# Patient Record
Sex: MALE | Race: WHITE | NOT HISPANIC OR LATINO | Employment: OTHER | URBAN - METROPOLITAN AREA
[De-identification: names, ages, dates, MRNs, and addresses within clinical notes are randomized per-mention and may not be internally consistent; named-entity substitution may affect disease eponyms.]

---

## 2017-01-06 ENCOUNTER — ANTICOAGULATION MONITORING (OUTPATIENT)
Dept: VASCULAR LAB | Facility: MEDICAL CENTER | Age: 82
End: 2017-01-06

## 2017-01-06 DIAGNOSIS — I48.91 ATRIAL FIBRILLATION, UNSPECIFIED TYPE (HCC): ICD-10-CM

## 2017-01-06 LAB — INR PPP: 2.4 (ref 2–3.5)

## 2017-01-06 NOTE — PROGRESS NOTES
Anticoagulation Summary as of 1/6/2017     INR goal 2.0-3.0   Selected INR 2.4 (1/6/2017)   Maintenance plan 8 mg (4 mg x 2) on Tue, Thu, Sat; 4 mg (4 mg x 1) all other days   Weekly total 40 mg   Plan last modified JARED RomanoPSUNSHINE (3/22/2016)   Next INR check 1/17/2017   Target end date Indefinite    Indications   Atrial fibrillation (HCC) [I48.91]         Anticoagulation Episode Summary     INR check location Outside Lab    Preferred lab     Send INR reminders to     Comments Please do not call before noon  [facility] for lab draws      Anticoagulation Care Providers     Provider Role Specialty Phone number    Jesús Bacon M.D. Referring Interventional Cardiology [phone]    Michael Morel, PHARMD Responsible      [email or web address] [email or web address]. Responsible          Spoke with Erasmo to report a therapeutic INR of 2.4. Pt is to continue with current warfarin dosing regimen.  Pt denies any unusual s/s of bleeding, bruising, clotting or any changes to diet or medications.  Follow up in 2 weeks.    Kelley Olivia, PHARMD

## 2017-01-17 ENCOUNTER — APPOINTMENT (OUTPATIENT)
Dept: VASCULAR LAB | Facility: MEDICAL CENTER | Age: 82
End: 2017-01-17
Payer: MEDICARE

## 2017-01-17 ENCOUNTER — ANTICOAGULATION MONITORING (OUTPATIENT)
Dept: VASCULAR LAB | Facility: MEDICAL CENTER | Age: 82
End: 2017-01-17

## 2017-01-17 ENCOUNTER — NON-PROVIDER VISIT (OUTPATIENT)
Dept: MEDICAL GROUP | Facility: PHYSICIAN GROUP | Age: 82
End: 2017-01-17
Payer: MEDICARE

## 2017-01-17 DIAGNOSIS — I48.91 ATRIAL FIBRILLATION, UNSPECIFIED TYPE (HCC): ICD-10-CM

## 2017-01-17 DIAGNOSIS — Z79.01 CHRONIC ANTICOAGULATION: ICD-10-CM

## 2017-01-17 LAB
INR BLD: 3.4 (ref 0.9–1.2)
INR PPP: 3.4 (ref 2–3.5)
POC PROTIME: ABNORMAL

## 2017-01-17 PROCEDURE — 85610 PROTHROMBIN TIME: CPT | Performed by: INTERNAL MEDICINE

## 2017-01-17 NOTE — PROGRESS NOTES
OP Anticoagulation Service Note    Date: 1/17/2017    Anticoagulation Summary as of 1/17/2017     INR goal 2.0-3.0   Selected INR 3.4! (1/17/2017)   Maintenance plan 8 mg (4 mg x 2) on Tue, Thu, Sat; 4 mg (4 mg x 1) all other days   Weekly total 40 mg   Plan last modified JARED RomanoPMamiNMami (3/22/2016)   Next INR check 1/31/2017   Target end date Indefinite    Indications   Atrial fibrillation (HCC) [I48.91]         Anticoagulation Episode Summary     INR check location Outside Lab    Preferred lab     Send INR reminders to     Comments Please do not call before noon  [facility] for lab draws      Anticoagulation Care Providers     Provider Role Specialty Phone number    Jesús Bacon M.D. Referring Interventional Cardiology [phone]    Michael Morel, PHARMD Responsible          Anticoagulation Patient Findings   Positives Antibiotic Use    Negatives Missed Doses, Extra Doses, Medication Changes, Diet Changes, Dental/Other Procedures, Hospitalization, Bleeding Gums, Nose Bleeds, Blood in Urine, Blood in Stool, Any Bruising, Other Complaints    Comments BP  100/58  HR  100  Off ABX for 3 days            THIS VISIT CONDUCTED WITH [employer]ING SECURE AND ENCRYPTED VIDEOCONFERENCING EQUIPMENT    ROS:    Pulm: Denies SOB, chest pain.    Card: Denies syncope, edema, palpitations.    Extremities: Denies redness, pain.     PE:    Pulm: No SOB, even and unlabored.    Card: Normal rate and rhythm.    Extremities: No redness, or edema.     INR  supra-therapeutic.    Denies signs/symptoms of bleeding and/or thrombosis.    He was on a ABX, off for the last 3 days    Follow up appt in 2 weeks     Hold dose tomorrow     Medication: Warfarin (Coumadin)     Sunday Monday Tuesday Wednesday Thursday Friday Saturday      4 mg    4 mg    8 mg    4 mg    8 mg    4 mg    8 mg      1 tab(s)    1 tab(s)    2 tab(s)    1 tab(s)    2 tab(s)    1 tab(s)  2 tab(s)     Next Appointment:  Tuesday, 1/31/2017 at 1:30pm      Review all of your home medications and newly ordered medications with your doctor and / or pharmacist. Follow medication instructions as directed by your doctor and / or pharmacist. Please keep your medication list with you and share with your physician. Update the information when medications are discontinued, doses are changed, or new medications (including over-the-counter products) are added; and carry medication information at all times in the event of emergency situations.      For questions, please contact Outpatient Anticoagulation Service [phone].     Felipe Anaya, NIRD

## 2017-01-19 ENCOUNTER — TELEPHONE (OUTPATIENT)
Dept: CARDIOLOGY | Facility: MEDICAL CENTER | Age: 82
End: 2017-01-19

## 2017-01-19 DIAGNOSIS — Z95.2 S/P TAVR (TRANSCATHETER AORTIC VALVE REPLACEMENT): ICD-10-CM

## 2017-01-19 NOTE — TELEPHONE ENCOUNTER
----- Message from Bianca Evans sent at 1/19/2017  9:42 AM PST -----  Regarding: patient wants to discuss letter he received from Medicare  XENIA/Rubio        Patient said he got a letter from Medicare about surgery being completed by Dr. Watson. He is upset because he thought it was done by Dr. Perez, and he thinks he was fraudently operated on. I told him the chart said it was Dr. Perez, so he wants to know why the letter says it was Dr. uDran. He can be reached at [phone]

## 2017-01-19 NOTE — TELEPHONE ENCOUNTER
"Pt gave details about his concerns regarding the letter he received from medicare that Dr. Duran performed his aortic valve replacement as referred to by Dr. Perez. Pt feels he was lied to as he thought Dr. Perez was doing the procedure and \"inserted the catheter through his artery to the aortic valve and then Dr. Duran was to replace the valve.\" Explained that this was true. Reassured the pt but he still feels like he was deceived saying, \"I never saw Dr. perez that day (of surgery) only Dr. Duran so how do I know he was even there.\" Reassured pt that Dr. Duran and Dr. Perez worked in collaboration to complete the procedure. Pt states, \"I dont mean to be rude but if you weren't in the operating room that day how can you say what was done\". Responded to the pt that health care providers are responsible to document everything that happened in the chart and that I am speaking to what is documented in his medical record. Pt is still very concerned.     Pt will bring the medicare letter to his appt tomorrow with XENIA to discuss with him.     To XENIA. FYI  "

## 2017-01-20 ENCOUNTER — OFFICE VISIT (OUTPATIENT)
Dept: CARDIOLOGY | Facility: PHYSICIAN GROUP | Age: 82
End: 2017-01-20
Payer: MEDICARE

## 2017-01-20 VITALS
WEIGHT: 139 LBS | DIASTOLIC BLOOD PRESSURE: 72 MMHG | BODY MASS INDEX: 23.73 KG/M2 | HEIGHT: 64 IN | SYSTOLIC BLOOD PRESSURE: 130 MMHG | HEART RATE: 100 BPM

## 2017-01-20 DIAGNOSIS — Z95.1 HX OF CABG: ICD-10-CM

## 2017-01-20 DIAGNOSIS — Z95.2 S/P TAVR (TRANSCATHETER AORTIC VALVE REPLACEMENT): ICD-10-CM

## 2017-01-20 DIAGNOSIS — I48.91 ATRIAL FIBRILLATION, UNSPECIFIED TYPE (HCC): ICD-10-CM

## 2017-01-20 DIAGNOSIS — I48.20 CHRONIC ATRIAL FIBRILLATION (HCC): ICD-10-CM

## 2017-01-20 DIAGNOSIS — I25.10 CORONARY ARTERY DISEASE INVOLVING NATIVE CORONARY ARTERY OF NATIVE HEART WITHOUT ANGINA PECTORIS: ICD-10-CM

## 2017-01-20 PROCEDURE — 99214 OFFICE O/P EST MOD 30 MIN: CPT | Performed by: INTERNAL MEDICINE

## 2017-01-20 ASSESSMENT — ENCOUNTER SYMPTOMS
PALPITATIONS: 0
PND: 0
ABDOMINAL PAIN: 0
WEAKNESS: 1
MYALGIAS: 0
LOSS OF CONSCIOUSNESS: 0
BRUISES/BLEEDS EASILY: 1
SHORTNESS OF BREATH: 1
BLURRED VISION: 0
DIZZINESS: 0
CHILLS: 0
INSOMNIA: 1
ORTHOPNEA: 0
FEVER: 0

## 2017-01-20 NOTE — PROGRESS NOTES
Subjective:   Erasmo North is a 86 y.o. male who presents today for TAVR follow up.    Since the patient's last visit on 12/06/16, he has continued to experience fatigue and shortness of breath. He denie chest pain, dizziness or syncope.     Past Medical History   Diagnosis Date   • Atrial fibrillation (CMS-HCC)    • Hypertension    • CAD (coronary artery disease) of bypass graft    • Osteoporosis    • Genital herpes    • S/P colonoscopy 11/2010   • Diverticulosis    • Colon polyps      sessile    • Hemorrhoids      internal   • Hiatal hernia    • Tachy-millicent syndrome    • Urothelial carcinoma of kidney (CMS-HCC) 2/22/2014     Bx right kidney 2/3/14   • Dialysis patient      Monday, Wednesday, Friday at [facility] in [place]   • Arthritis    • Myocardial infarct (CMS-HCC) 1971   • Bowel habit changes      occasional diarrhea   • Dental disorder      upper/lower   • Breath shortness      with exertion   • Cataract      surgery bilateral IOL   • Urothelial carcinoma (CMS-HCC) 2/22/2014     Of the bladder; bx 2/3/14; Dr. Bey, pt states right kidney   • Aortic stenosis, severe    • Psoriasis    • Dialysis AV fistula infection (CMS-HCC)      right arm     Past Surgical History   Procedure Laterality Date   • Coronary artery bypass, 3  1974, 1983      had 2 times   • Hernia repair  1999   • Tonsillectomy     • Cath placement Left 8/31/2015     Procedure: CATH PLACEMENT, left IJ tunneled catheter;  Surgeon: Jaswinder Oneil M.D.;  Location: SURGERY [facility];  Service:    • Av fistula creation Right 9/8/2015     Procedure: AV FISTULA CREATION ARM;  Surgeon: Jaswinder Oneil M.D.;  Location: SURGERY [facility];  Service:    • Recovery  11/12/2015     Procedure: VASCULAR CASE-ANTHONY-RIGHT ARM FISTULOGRAM WITH ANGIOPLASTY 31007, 91048, 49512-MBW STAGE RENAL DISEASE N18.6;  Surgeon: [facility];  Location: SURGERY PRE-POST PROC UNIT [facility];  Service:    • Inguinal hernia repair  1/5/2012     Performed by  ROSI PHILLIPS at SURGERY [facility]   • Exploratory laparotomy  1/8/2012     Performed by ROSI PHILLIPS at SURGERY [facility]   • Bowel resection  1/8/2012     Performed by ROSI PHILLIPS at SURGERY [facility]   • Other abdominal surgery  5/29/2015     right nephrectomy, [facility]   • Transcatheter aortic valve replacement  11/28/2016     Procedure: TRANSCATHETER AORTIC VALVE REPLACEMENT WITH CHAZ;  Surgeon: Mateo Perez M.D.;  Location: SURGERY [facility];  Service:      Family History   Problem Relation Age of Onset   • Heart Disease Mother      MI age 75 yo   • Heart Disease Brother      MI 38 yo   • Heart Disease Brother      MI 38 yo   • Cancer Neg Hx    • Diabetes Neg Hx      History   Smoking status   • Former Smoker -- 1.00 packs/day for 27 years   • Types: Cigarettes   • Quit date: 01/01/1974   Smokeless tobacco   • Never Used     Comment: started age 17, quit age 44     No Known Allergies     Medications reviewed.    Outpatient Encounter Prescriptions as of 1/20/2017   Medication Sig Dispense Refill   • omeprazole (PRILOSEC) 40 MG delayed-release capsule Take 40 mg by mouth every day.     • metoprolol SR (TOPROL XL) 25 MG TABLET SR 24 HR Take 25 mg by mouth every day.     • acetaminophen (TYLENOL) 325 MG Tab Take 325-650 mg by mouth every four hours as needed for Moderate Pain.     • warfarin (COUMADIN) 4 MG Tab Take 4-8 mg by mouth every day. Pt takes:  4MG on Sun,Mon,Wed, and Fri.  8MG on Tue, Thur, and Sat     • Multiple Vitamins-Minerals (CENTRUM ADULTS PO) Take 1 Tab by mouth every day.     • IRON PO Take 1 Tab by mouth every day.     • B Complex Vitamins (VITAMIN B COMPLEX PO) Take 1 Tab by mouth every day.     • Calcium Citrate-Vitamin D (CALCIUM + D PO) Take 1 Tab by mouth every day.     • atorvastatin (LIPITOR) 10 MG Tab Take 10 mg by mouth every evening.     • finasteride (PROSCAR) 5 MG Tab Take 5 mg by mouth every bedtime.       No facility-administered  "encounter medications on file as of 1/20/2017.     Review of Systems   Constitutional: Positive for malaise/fatigue. Negative for fever and chills.   HENT: Negative for congestion.    Eyes: Negative for blurred vision.   Respiratory: Positive for shortness of breath.    Cardiovascular: Negative for chest pain, palpitations, orthopnea, leg swelling and PND.   Gastrointestinal: Negative for abdominal pain.   Genitourinary: Negative for dysuria.   Musculoskeletal: Negative for myalgias and joint pain.   Skin: Negative for rash.   Neurological: Negative for dizziness and loss of consciousness.   Endo/Heme/Allergies: Bruises/bleeds easily.   Psychiatric/Behavioral: The patient has insomnia.         Objective:   /72 mmHg  Pulse 100  Ht 1.626 m (5' 4\")  Wt 63.05 kg (139 lb)  BMI 23.85 kg/m2    Physical Exam   Constitutional: He is oriented to person, place, and time. He appears well-developed and well-nourished.   HENT:   Head: Normocephalic and atraumatic.   Eyes: Conjunctivae are normal. Pupils are equal, round, and reactive to light.   Neck: Normal range of motion. Neck supple.   Cardiovascular: Normal rate.  An irregularly irregular rhythm present.   Pulmonary/Chest: Effort normal and breath sounds normal.   Abdominal: Soft. Bowel sounds are normal.   Musculoskeletal: Normal range of motion. He exhibits no edema.   Neurological: He is alert and oriented to person, place, and time.   Skin: Skin is warm and dry.   Psychiatric: He has a normal mood and affect.     CARDIAC STUDIES/PROCEDURES:    CARDIAC CATHETERIZATION CONCLUSIONS (11/10/16)  1.  Severe aortic stenosis with peak gradient of 30 mmHg, mean gradient of 19    mmHg, calculated ZEYAD of 0.99 cm2.  2.  Coronary artery disease including occluded ostial left main coronary    artery and proximal right coronary artery with patent left internal mammary    artery to the left anterior descending artery and saphenous vein graft to the    posterior descending " artery, occluded single saphenous vein graft.  3.  Mildly reduced left ventricular systolic function with ejection fraction    of 50%.  4.  Moderately calcified trileaflet aortic valve, mild dilation of the    ascending aorta.  5.  Atherosclerotic distal abdominal aorta with small aneurysmal pouches,    severely calcified bilateral ostial to proximal common iliac system, right    greater than left and right iliacs also containing eccentric 40-50% stenosis.    CT OF CHEST AND ABDOMEN (11/04/16)  1. Tricuspid aortic valve with moderate calcifications.  Annulus area: 566-570 mm?  Annulus diameter: 23.7 x 29.3 mm  Diameter at the level of the sinuses: 38.2 x 38.4 x 39.2 mm  2. Access evaluation:  Mild scattered calcification and low density plaque.  Measurement, minimal luminal diameter:  Right mid external iliac artery:   8.4 mm  Left proximal external iliac artery: 8.0  mm  3. Postsurgical changes from CABG.  4. Small left pleural effusion, trace right pleural effusion with adjacent opacities, likely atelectasis.    CAROTID ULTRASOUND (11/08/16)   Carotid ultrasound showing mild to moderate carotid artery stenosis.    ECHOCARDIOGRAM CONCLUSIONS (01/10/17)  Echocardiogram showing normal left ventricular systolic function, ejection fraction of 50%,known TAVR valve with normal gradient with peak velocity of 2.4 m/s, peak gradient of 24 mmHg, mean gradient of 13 mmHg, mild mitral regurgitation.    ECHOCARDIOGRAM CONCLUSIONS (11/29/16)  Normal left ventricular systolic function, EF 65%.   Mild concentric left ventricular hypertrophy.   Moderately dilated right ventricle.   Severely dilated left atrium.   Mitral annular calcification.   Mild mitral regurgitation.  Known TAVR aortic valve that is functioning normally with normal   transvalvular gradients.    Transvalvular gradients are - Peak: 14 mmHg, Mean: 7 mmHg.   Trace paravalvular leak is noted.   Mild tricuspid regurgitation.   Right ventricular systolic pressure is  estimated to be 35 mmHg.  Compared to the images of the study done 8/31/15 - there has been   interval placement of a bioprosthetic aortic valve that is functioning   normally and normalization of left ventricular ejection fraction.    ECHOCARDIOGRAM CONCLUSIONS at [facility] (07/05/16)  Echocardiogram showing ejection fraction of 50%, severe aortic stenosis with peak velocity of 4.01 m/s, peak gradient of 68 mmHg, mean gradient of 41mmHg and calculated ZEYAD of 0.6 cm2.    EKG performed on (11/29/16) was reviewed: EKG shows atrial fibrillation with right bundle branch block    Laboratory results of (12/22/16) were reviewed. Bun of 38 mg/dl, creatinine levels of 3.13 mg/dl noted.  Laboratory results of (11/29/16) were reviewed. Bun of 20 mg/dl, creatinine levels of 2.66 mg/dl noted.    PULMONARY FUNCTION INTERPRETATION (10/27/16)  1.  The mechanics of ventilation are normal.  2.  There is no improvement following the administration of an inhaled    bronchodilator.  Clinical improvement may occur in the absence of documented    spirometric improvement.  3.  Normal total lung capacity by body plethysmography.  This rules out    restrictive lung defect.  4.  Severely decreased diffusing capacity for carbon monoxide, 51% of    predicted; this indicates loss of alveolocapillary membrane surface area.  5.  Normal airway resistance.  6.  Review of the flow volume loop shows a normal tracing.  7.  Normal room air oximetry.    TRANSESOPHAGEAL ECHOCARDIOGRAM CONCLUSIONS (11/28/16)  TAVR case. Trileaflet aortic stenosi. PG only 18 . Post #29 TAVR with   pinpoint leak at 1 oclock in the .   Short axis. LVOT/ CW valve=12.2/30 c/w well functioning valve.  EF=50%.    Assessment:     1. S/P TAVR (transcatheter aortic valve replacement)     2. Coronary artery disease involving native coronary artery of native heart without angina pectoris     3. Atrial fibrillation, unspecified type (HCC)     4. Chronic  anticoagulation     5. Hx of CABG     6. ESRD (end stage renal disease) ([facility])       Medical Decision Making:  Today's Assessment / Status / Plan:     1. Successful transcatheter aortic valve replacement (TAVR) with #29 Ramirez Isabell S3 valve, transfemoral approach under general anesthesia (11/29/16): He is clinically doing well, NYHA class I. We will repeat an echocardiogram in one year and follow up with him.  2. Coronary artery disease with remote coronary artery bypass graft surgery ([facility] 1975, [place] 1984): He remains clinically stable. Plan as above.  3. Atrial fibrillation on anticoagulation therapy (warfarin):The ventricular rate is well controlled.  4. End stage renal disease right kidney cancer and is status post nephrectomy with Dr. Iker Bey on dialysis    We will follow up with him in one month and one year from TAVR standpoint and also have him follow up with his primary cardiologist, Dr. Bacon.    CC Jesús Rodriguez and Milan Ward

## 2017-01-20 NOTE — PROGRESS NOTES
Subjective:   Erasmo North is a 86 y.o. male who presents today for TAVR follow up.    Since the patient's last visit on 12/06/16, he has continued to experience mild fatigue, shortness of breath and dyspnea on exertion.  He denies chest pain, dizziness or syncope.     Past Medical History   Diagnosis Date   • Atrial fibrillation (CMS-[facility])    • Hypertension    • CAD (coronary artery disease) of bypass graft    • Osteoporosis    • Genital herpes    • S/P colonoscopy 11/2010   • Diverticulosis    • Colon polyps      sessile    • Hemorrhoids      internal   • Hiatal hernia    • Tachy-millicent syndrome    • Urothelial carcinoma of kidney (CMS-HCC) 2/22/2014     Bx right kidney 2/3/14   • Dialysis patient      Monday, Wednesday, Friday at [facility] in [place]   • Arthritis    • Myocardial infarct (CMS-[facility]) 1971   • Bowel habit changes      occasional diarrhea   • Dental disorder      upper/lower   • Breath shortness      with exertion   • Cataract      surgery bilateral IOL   • Urothelial carcinoma (CMS-[facility]) 2/22/2014     Of the bladder; bx 2/3/14; Dr. Bey, pt states right kidney   • Aortic stenosis, severe    • Psoriasis    • Dialysis AV fistula infection (CMS-[facility])      right arm     Past Surgical History   Procedure Laterality Date   • Coronary artery bypass, 3  1974, 1983      had 2 times   • Hernia repair  1999   • Tonsillectomy     • Cath placement Left 8/31/2015     Procedure: CATH PLACEMENT, left IJ tunneled catheter;  Surgeon: Jaswinder Oneil M.D.;  Location: SURGERY [facility];  Service:    • Av fistula creation Right 9/8/2015     Procedure: AV FISTULA CREATION ARM;  Surgeon: Jaswinder Oneil M.D.;  Location: SURGERY [facility];  Service:    • Recovery  11/12/2015     Procedure: VASCULAR CASE-ANTHONY-RIGHT ARM FISTULOGRAM WITH ANGIOPLASTY 44952, 24047, 96601-BIR STAGE RENAL DISEASE N18.6;  Surgeon: [facility] Surgery;  [place] [facility] [place]  [place]:    • Inguinal hernia repair   1/5/2012     Performed by ROSI PHILLIPS at SURGERY [facility]   • Exploratory laparotomy  1/8/2012     Performed by ROSI PHILLIPS at SURGERY [facility]   • Bowel resection  1/8/2012     Performed by ROSI PHILLIPS at SURGERY [facility]   • Other abdominal surgery  5/29/2015     right nephrectomy, [facility]   • Transcatheter aortic valve replacement  11/28/2016     Procedure: TRANSCATHETER AORTIC VALVE REPLACEMENT WITH CHAZ;  Surgeon: Mateo Perez M.D.;  Location: SURGERY [facility];  Service:      Family History   Problem Relation Age of Onset   • Heart Disease Mother      MI age 77 yo   • Heart Disease Brother      MI 38 yo   • Heart Disease Brother      MI 38 yo   • Cancer Neg Hx    • Diabetes Neg Hx      History   Smoking status   • Former Smoker -- 1.00 packs/day for 27 years   • Types: Cigarettes   • Quit date: 01/01/1974   Smokeless tobacco   • Never Used     Comment: started age 17, quit age 44     No Known Allergies     Medications reviewed.    Outpatient Encounter Prescriptions as of 1/20/2017   Medication Sig Dispense Refill   • omeprazole (PRILOSEC) 40 MG delayed-release capsule Take 40 mg by mouth every day.     • metoprolol SR (TOPROL XL) 25 MG TABLET SR 24 HR Take 25 mg by mouth every day.     • acetaminophen (TYLENOL) 325 MG Tab Take 325-650 mg by mouth every four hours as needed for Moderate Pain.     • warfarin (COUMADIN) 4 MG Tab Take 4-8 mg by mouth every day. Pt takes:  4MG on Sun,Mon,Wed, and Fri.  8MG on Tue, Thur, and Sat     • Multiple Vitamins-Minerals (CENTRUM ADULTS PO) Take 1 Tab by mouth every day.     • IRON PO Take 1 Tab by mouth every day.     • B Complex Vitamins (VITAMIN B COMPLEX PO) Take 1 Tab by mouth every day.     • Calcium Citrate-Vitamin D (CALCIUM + D PO) Take 1 Tab by mouth every day.     • atorvastatin (LIPITOR) 10 MG Tab Take 10 mg by mouth every evening.     • finasteride (PROSCAR) 5 MG Tab Take 5 mg by mouth every bedtime.       No  "facility-administered encounter medications on file as of 1/20/2017.     Review of Systems   Constitutional: Negative for fever and chills.   HENT: Negative for congestion.    Eyes: Negative for blurred vision.   Respiratory: Positive for shortness of breath.    Cardiovascular: Negative for chest pain, palpitations, orthopnea, leg swelling and PND.   Gastrointestinal: Negative for abdominal pain.   Genitourinary: Negative for dysuria.   Musculoskeletal: Negative for myalgias and joint pain.   Skin: Negative for rash.   Neurological: Positive for weakness. Negative for dizziness and loss of consciousness.   Endo/Heme/Allergies: Bruises/bleeds easily.   Psychiatric/Behavioral: The patient has insomnia.         Objective:   /72 mmHg  Pulse 100  Ht 1.626 m (5' 4\")  Wt 63.05 kg (139 lb)  BMI 23.85 kg/m2    Physical Exam   Constitutional: He is oriented to person, place, and time. He appears well-developed and well-nourished.   HENT:   Head: Normocephalic and atraumatic.   Eyes: Conjunctivae are normal. Pupils are equal, round, and reactive to light.   Neck: Normal range of motion. Neck supple.   Cardiovascular: Normal rate and regular rhythm.    Pulmonary/Chest: Effort normal and breath sounds normal.   Abdominal: Soft. Bowel sounds are normal.   Musculoskeletal: Normal range of motion. He exhibits no edema.   Neurological: He is alert and oriented to person, place, and time.   Skin: Skin is warm and dry.   Psychiatric: He has a normal mood and affect.     CARDIAC STUDIES/PROCEDURES:    CARDIAC CATHETERIZATION CONCLUSIONS (11/10/16)  1.  Severe aortic stenosis with peak gradient of 30 mmHg, mean gradient of 19    mmHg, calculated ZEYAD of 0.99 cm2.  2.  Coronary artery disease including occluded ostial left main coronary    artery and proximal right coronary artery with patent left internal mammary    artery to the left anterior descending artery and saphenous vein graft to the    posterior descending artery, " occluded single saphenous vein graft.  3.  Mildly reduced left ventricular systolic function with ejection fraction    of 50%.  4.  Moderately calcified trileaflet aortic valve, mild dilation of the    ascending aorta.  5.  Atherosclerotic distal abdominal aorta with small aneurysmal pouches,    severely calcified bilateral ostial to proximal common iliac system, right    greater than left and right iliacs also containing eccentric 40-50% stenosis.    CT OF CHEST AND ABDOMEN (11/04/16)  1. Tricuspid aortic valve with moderate calcifications.  Annulus area: 566-570 mm?  Annulus diameter: 23.7 x 29.3 mm  Diameter at the level of the sinuses: 38.2 x 38.4 x 39.2 mm  2. Access evaluation:  Mild scattered calcification and low density plaque.  Measurement, minimal luminal diameter:  Right mid external iliac artery:   8.4 mm  Left proximal external iliac artery: 8.0  mm  3. Postsurgical changes from CABG.  4. Small left pleural effusion, trace right pleural effusion with adjacent opacities, likely atelectasis.    CAROTID ULTRASOUND (11/08/16)   Carotid ultrasound showing mild to moderate carotid artery stenosis.    ECHOCARDIOGRAM CONCLUSIONS (11/29/16)  Normal left ventricular systolic function, EF 65%.   Mild concentric left ventricular hypertrophy.   Moderately dilated right ventricle.   Severely dilated left atrium.   Mitral annular calcification.   Mild mitral regurgitation.  Known TAVR aortic valve that is functioning normally with normal   transvalvular gradients.    Transvalvular gradients are - Peak: 14 mmHg, Mean: 7 mmHg.   Trace paravalvular leak is noted.   Mild tricuspid regurgitation.   Right ventricular systolic pressure is estimated to be 35 mmHg.  Compared to the images of the study done 8/31/15 - there has been   interval placement of a bioprosthetic aortic valve that is functioning   normally and normalization of left ventricular ejection fraction.    ECHOCARDIOGRAM CONCLUSIONS at [facility] [place] (07/05/16)  Echocardiogram showing ejection fraction of 50%, severe aortic stenosis with peak velocity of 4.01 m/s, peak gradient of 68 mmHg, mean gradient of 41mmHg and calculated ZEYAD of 0.6 cm2.    EKG performed on (11/29/16) was reviewed: EKG shows atrial fibrillation with right bundle branch block    Laboratory results of (11/29/16) were reviewed. Bun of 20 mg/dl, creatinine levels of 2.66 mg/dl noted.    PULMONARY FUNCTION INTERPRETATION (10/27/16)  1.  The mechanics of ventilation are normal.  2.  There is no improvement following the administration of an inhaled    bronchodilator.  Clinical improvement may occur in the absence of documented    spirometric improvement.  3.  Normal total lung capacity by body plethysmography.  This rules out    restrictive lung defect.  4.  Severely decreased diffusing capacity for carbon monoxide, 51% of    predicted; this indicates loss of alveolocapillary membrane surface area.  5.  Normal airway resistance.  6.  Review of the flow volume loop shows a normal tracing.  7.  Normal room air oximetry.    TRANSESOPHAGEAL ECHOCARDIOGRAM CONCLUSIONS (11/28/16)  TAVR case. Trileaflet aortic stenosi. PG only 18 . Post #29 TAVR with   pinpoint leak at 1 oclock in the .   Short axis. LVOT/ CW valve=12.2/30 c/w well functioning valve.  EF=50%.    Assessment:     1. S/P TAVR (transcatheter aortic valve replacement)     2. Coronary artery disease involving native coronary artery of native heart without angina pectoris     3. Atrial fibrillation, unspecified type ([facility])     4. Chronic anticoagulation     5. Hx of CABG     6. ESRD (end stage renal disease) ([facility])       Medical Decision Making:  Today's Assessment / Status / Plan:     1. Successful transcatheter aortic valve replacement (TAVR) with #29 Ramirez Isabell S3 valve, transfemoral approach under general anesthesia (11/29/16): He is clinically doing well from valve standpoint, NYHA class I. We will repeat an  echocardiogram in one year and follow up with him.  2. Coronary artery disease with remote coronary artery bypass graft surgery ([facility] 1975, [place] 1984): He remains clinically stable. Plan as above.  3. Atrial fibrillation on anticoagulation therapy (warfarin):The ventricular rate is well controlled.  4. End stage renal disease right kidney cancer and is status post nephrectomy with Dr. Iker Bey on dialysis  5. COPD: His shortness of breath is likely pulmonary in etiology. We will have him follow up with his primary care physician.    We will follow up with him in one year from TAVR standpoint and also have him follow up with his primary cardiologist, Dr. Bacon.    CC Jesús Rodriguez and Milan Ward

## 2017-01-20 NOTE — Clinical Note
[facility] [address]  Phone: [phone]  Fax: [phone]              Erasmo North  10/13/1930    Encounter Date: 1/20/2017    Mateo Perez M.D.          PROGRESS NOTE:  Subjective:   Erasmo North is a 86 y.o. male who presents today for TAVR follow up.    Since the patient's last visit on 12/06/16, he has continued to experience fatigue and shortness of breath. He denie chest pain, dizziness or syncope.     Past Medical History   Diagnosis Date   • Atrial fibrillation (CMS-HCC)    • Hypertension    • CAD (coronary artery disease) of bypass graft    • Osteoporosis    • Genital herpes    • S/P colonoscopy 11/2010   • Diverticulosis    • Colon polyps      sessile    • Hemorrhoids      internal   • Hiatal hernia    • Tachy-millicent syndrome    • Urothelial carcinoma of kidney (CMS-HCC) 2/22/2014     Bx right kidney 2/3/14   • Dialysis patient      Monday, Wednesday, Friday at [facility] in [place]   • Arthritis    • Myocardial infarct (CMS-HCC) 1971   • Bowel habit changes      occasional diarrhea   • Dental disorder      upper/lower   • Breath shortness      with exertion   • Cataract      surgery bilateral IOL   • Urothelial carcinoma (CMS-HCC) 2/22/2014     Of the bladder; bx 2/3/14; Dr. Bey, pt states right kidney   • Aortic stenosis, severe    • Psoriasis    • Dialysis AV fistula infection (CMS-HCC)      right arm     Past Surgical History   Procedure Laterality Date   • Coronary artery bypass, 3  1974, 1983      had 2 times   • Hernia repair  1999   • Tonsillectomy     • Cath placement Left 8/31/2015     Procedure: CATH PLACEMENT, left IJ tunneled catheter;  Surgeon: Jaswinder Oneil M.D.;  Location: SURGERY [facility];  Service:    • Av fistula creation Right 9/8/2015     Procedure: AV FISTULA CREATION ARM;  Surgeon: Jaswinder Oneil M.D.;  Location: SURGERY [facility];  Service:    • Recovery  11/12/2015      Procedure: VASCULAR CASE-ANTHONY-RIGHT ARM FISTULOGRAM WITH ANGIOPLASTY 18991, 22144, 35088-IOI STAGE RENAL DISEASE N18.6;  Surgeon: [facility];  Location[place] SURGERY PRE-[place] PROC [place] [facility] [place];  Service:    • Inguinal hernia repair  1/5/2012     Performed by ROSI PHILLIPS at SURGERY [facility]   • Exploratory laparotomy  1/8/2012     Performed by ROSI PHILLIPS at SURGERY [facility]   • Bowel resection  1/8/2012     Performed by ROSI PHILLIPS at SURGERY [facility]   • Other abdominal surgery  5/29/2015     right nephrectomy, [facility]   • Transcatheter aortic valve replacement  11/28/2016     Procedure: TRANSCATHETER AORTIC VALVE REPLACEMENT WITH CHAZ;  Surgeon: Mateo Perez M.D.;  Location: SURGERY [facility];  Service:      Family History   Problem Relation Age of Onset   • Heart Disease Mother      MI age 75 yo   • Heart Disease Brother      MI 38 yo   • Heart Disease Brother      MI 38 yo   • Cancer Neg Hx    • Diabetes Neg Hx      History   Smoking status   • Former Smoker -- 1.00 packs/day for 27 years   • Types: Cigarettes   • Quit date: 01/01/1974   Smokeless tobacco   • Never Used     Comment: started age 17, quit age 44     No Known Allergies     Medications reviewed.    Outpatient Encounter Prescriptions as of 1/20/2017   Medication Sig Dispense Refill   • omeprazole (PRILOSEC) 40 MG delayed-release capsule Take 40 mg by mouth every day.     • metoprolol SR (TOPROL XL) 25 MG TABLET SR 24 HR Take 25 mg by mouth every day.     • acetaminophen (TYLENOL) 325 MG Tab Take 325-650 mg by mouth every four hours as needed for Moderate Pain.     • warfarin (COUMADIN) 4 MG Tab Take 4-8 mg by mouth every day. Pt takes:  4MG on Sun,Mon,Wed, and Fri.  8MG on Tue, Thur, and Sat     • Multiple Vitamins-Minerals (CENTRUM ADULTS PO) Take 1 Tab by mouth every day.     • IRON PO Take 1 Tab by mouth every day.     • B Complex Vitamins (VITAMIN B COMPLEX PO) Take 1 Tab by mouth every  "day.     • Calcium Citrate-Vitamin D (CALCIUM + D PO) Take 1 Tab by mouth every day.     • atorvastatin (LIPITOR) 10 MG Tab Take 10 mg by mouth every evening.     • finasteride (PROSCAR) 5 MG Tab Take 5 mg by mouth every bedtime.       No facility-administered encounter medications on file as of 1/20/2017.     Review of Systems   Constitutional: Positive for malaise/fatigue. Negative for fever and chills.   HENT: Negative for congestion.    Eyes: Negative for blurred vision.   Respiratory: Positive for shortness of breath.    Cardiovascular: Negative for chest pain, palpitations, orthopnea, leg swelling and PND.   Gastrointestinal: Negative for abdominal pain.   Genitourinary: Negative for dysuria.   Musculoskeletal: Negative for myalgias and joint pain.   Skin: Negative for rash.   Neurological: Negative for dizziness and loss of consciousness.   Endo/Heme/Allergies: Bruises/bleeds easily.   Psychiatric/Behavioral: The patient has insomnia.         Objective:   /72 mmHg  Pulse 100  Ht 1.626 m (5' 4\")  Wt 63.05 kg (139 lb)  BMI 23.85 kg/m2    Physical Exam   Constitutional: He is oriented to person, place, and time. He appears well-developed and well-nourished.   HENT:   Head: Normocephalic and atraumatic.   Eyes: Conjunctivae are normal. Pupils are equal, round, and reactive to light.   Neck: Normal range of motion. Neck supple.   Cardiovascular: Normal rate.  An irregularly irregular rhythm present.   Pulmonary/Chest: Effort normal and breath sounds normal.   Abdominal: Soft. Bowel sounds are normal.   Musculoskeletal: Normal range of motion. He exhibits no edema.   Neurological: He is alert and oriented to person, place, and time.   Skin: Skin is warm and dry.   Psychiatric: He has a normal mood and affect.     CARDIAC STUDIES/PROCEDURES:    CARDIAC CATHETERIZATION CONCLUSIONS (11/10/16)  1.  Severe aortic stenosis with peak gradient of 30 mmHg, mean gradient of 19    mmHg, calculated ZEYAD of 0.99 " cm2.  2.  Coronary artery disease including occluded ostial left main coronary    artery and proximal right coronary artery with patent left internal mammary    artery to the left anterior descending artery and saphenous vein graft to the    posterior descending artery, occluded single saphenous vein graft.  3.  Mildly reduced left ventricular systolic function with ejection fraction    of 50%.  4.  Moderately calcified trileaflet aortic valve, mild dilation of the    ascending aorta.  5.  Atherosclerotic distal abdominal aorta with small aneurysmal pouches,    severely calcified bilateral ostial to proximal common iliac system, right    greater than left and right iliacs also containing eccentric 40-50% stenosis.    CT OF CHEST AND ABDOMEN (11/04/16)  1. Tricuspid aortic valve with moderate calcifications.  Annulus area: 566-570 mm?  Annulus diameter: 23.7 x 29.3 mm  Diameter at the level of the sinuses: 38.2 x 38.4 x 39.2 mm  2. Access evaluation:  Mild scattered calcification and low density plaque.  Measurement, minimal luminal diameter:  Right mid external iliac artery:   8.4 mm  Left proximal external iliac artery: 8.0  mm  3. Postsurgical changes from CABG.  4. Small left pleural effusion, trace right pleural effusion with adjacent opacities, likely atelectasis.    CAROTID ULTRASOUND (11/08/16)   Carotid ultrasound showing mild to moderate carotid artery stenosis.    ECHOCARDIOGRAM CONCLUSIONS (01/10/17)  Echocardiogram showing normal left ventricular systolic function, ejection fraction of 50%,known TAVR valve with normal gradient with peak velocity of 2.4 m/s, peak gradient of 24 mmHg, mean gradient of 13 mmHg, mild mitral regurgitation.    ECHOCARDIOGRAM CONCLUSIONS (11/29/16)  Normal left ventricular systolic function, EF 65%.   Mild concentric left ventricular hypertrophy.   Moderately dilated right ventricle.   Severely dilated left atrium.   Mitral annular calcification.   Mild mitral  regurgitation.  Known TAVR aortic valve that is functioning normally with normal   transvalvular gradients.    Transvalvular gradients are - Peak: 14 mmHg, Mean: 7 mmHg.   Trace paravalvular leak is noted.   Mild tricuspid regurgitation.   Right ventricular systolic pressure is estimated to be 35 mmHg.  Compared to the images of the study done 8/31/15 - there has been   interval placement of a bioprosthetic aortic valve that is functioning   normally and normalization of left ventricular ejection fraction.    ECHOCARDIOGRAM CONCLUSIONS at [facility] (07/05/16)  Echocardiogram showing ejection fraction of 50%, severe aortic stenosis with peak velocity of 4.01 m/s, peak gradient of 68 mmHg, mean gradient of 41mmHg and calculated ZEYAD of 0.6 cm2.    EKG performed on (11/29/16) was reviewed: EKG shows atrial fibrillation with right bundle branch block    Laboratory results of (12/22/16) were reviewed. Bun of 38 mg/dl, creatinine levels of 3.13 mg/dl noted.  Laboratory results of (11/29/16) were reviewed. Bun of 20 mg/dl, creatinine levels of 2.66 mg/dl noted.    PULMONARY FUNCTION INTERPRETATION (10/27/16)  1.  The mechanics of ventilation are normal.  2.  There is no improvement following the administration of an inhaled    bronchodilator.  Clinical improvement may occur in the absence of documented    spirometric improvement.  3.  Normal total lung capacity by body plethysmography.  This rules out    restrictive lung defect.  4.  Severely decreased diffusing capacity for carbon monoxide, 51% of    predicted; this indicates loss of alveolocapillary membrane surface area.  5.  Normal airway resistance.  6.  Review of the flow volume loop shows a normal tracing.  7.  Normal room air oximetry.    TRANSESOPHAGEAL ECHOCARDIOGRAM CONCLUSIONS (11/28/16)  TAVR case. Trileaflet aortic stenosi. PG only 18 . Post #29 TAVR with   pinpoint leak at 1 oclock in the .   Short axis. LVOT/ CW valve=12.2/30 c/w  well functioning valve.  EF=50%.    Assessment:     1. S/P TAVR (transcatheter aortic valve replacement)     2. Coronary artery disease involving native coronary artery of native heart without angina pectoris     3. Atrial fibrillation, unspecified type ([facility])     4. Chronic anticoagulation     5. Hx of CABG     6. ESRD (end stage renal disease) ([facility])       Medical Decision Making:  Today's Assessment / Status / Plan:     1. Successful transcatheter aortic valve replacement (TAVR) with #29 Ramirez Isabell S3 valve, transfemoral approach under general anesthesia (11/29/16): He is clinically doing well, NYHA class I. We will repeat an echocardiogram in one year and follow up with him.  2. Coronary artery disease with remote coronary artery bypass graft surgery ([facility] 1975, [place] 1984): He remains clinically stable. Plan as above.  3. Atrial fibrillation on anticoagulation therapy (warfarin):The ventricular rate is well controlled.  4. End stage renal disease right kidney cancer and is status post nephrectomy with Dr. Iker Bey on dialysis    We will follow up with him in one month and one year from TAVR standpoint and also have him follow up with his primary cardiologist, Dr. Bacon.    CC Jesús Rodriguez and Milan Ward             No Recipients

## 2017-01-25 DIAGNOSIS — Z95.2 S/P TAVR (TRANSCATHETER AORTIC VALVE REPLACEMENT): ICD-10-CM

## 2017-01-31 ENCOUNTER — ANTICOAGULATION MONITORING (OUTPATIENT)
Dept: VASCULAR LAB | Facility: MEDICAL CENTER | Age: 82
End: 2017-01-31

## 2017-01-31 ENCOUNTER — NON-PROVIDER VISIT (OUTPATIENT)
Dept: MEDICAL GROUP | Facility: PHYSICIAN GROUP | Age: 82
End: 2017-01-31
Payer: MEDICARE

## 2017-01-31 ENCOUNTER — APPOINTMENT (OUTPATIENT)
Dept: VASCULAR LAB | Facility: MEDICAL CENTER | Age: 82
End: 2017-01-31
Attending: NURSE PRACTITIONER
Payer: MEDICARE

## 2017-01-31 DIAGNOSIS — Z79.01 CHRONIC ANTICOAGULATION: ICD-10-CM

## 2017-01-31 DIAGNOSIS — I48.91 ATRIAL FIBRILLATION, UNSPECIFIED TYPE (HCC): ICD-10-CM

## 2017-01-31 LAB
INR BLD: 2.5 (ref 0.9–1.2)
INR PPP: 2.5 (ref 2–3.5)
POC PROTIME: ABNORMAL

## 2017-01-31 PROCEDURE — 85610 PROTHROMBIN TIME: CPT | Performed by: PHYSICIAN ASSISTANT

## 2017-01-31 RX ORDER — WARFARIN SODIUM 4 MG/1
8 TABLET ORAL DAILY
Qty: 180 TAB | Refills: 1 | Status: SHIPPED | OUTPATIENT
Start: 2017-01-31 | End: 2017-04-25

## 2017-01-31 NOTE — PROGRESS NOTES
OP Anticoagulation Service Note    Date: 1/31/2017      Anticoagulation Summary as of 1/31/2017     INR goal 2.0-3.0   Selected INR 2.5 (1/31/2017)   Maintenance plan 8 mg (4 mg x 2) on Tue, Thu, Sat; 4 mg (4 mg x 1) all other days   Weekly total 40 mg   Plan last modified JARED RomanoPSUNSHINE (3/22/2016)   Next INR check 2/21/2017   Target end date Indefinite    Indications   Atrial fibrillation (HCC) [I48.91]         Anticoagulation Episode Summary     INR check location Outside Lab    Preferred lab     Send INR reminders to     Comments Please do not call before noon  [facility] for lab draws      Anticoagulation Care Providers     Provider Role Specialty Phone number    Jesús Bacon M.D. Referring Interventional Cardiology [phone]    Michael Morel, PHARMD Responsible          Anticoagulation Patient Findings   Negatives Missed Doses, Extra Doses, Medication Changes, Antibiotic Use, Diet Changes, Dental/Other Procedures, Hospitalization, Bleeding Gums, Nose Bleeds, Blood in Urine, Blood in Stool, Any Bruising, Other Complaints    Comments /50  HR 77        Plan:  THIS VISIT CONDUCTED WITH PRESENTER VIA TELE[employer]ICINE UTILIZING SECURE AND ENCRYPTED VIDEOCONFERENCING EQUIPMENT    ROS:    Pulm: Denies SOB, chest pain.    Card: Denies syncope, edema, palpitations.    Extremities: Denies redness, pain.     PE:    Pulm: No SOB, even and unlabored.    Card: Normal rate and rhythm.    Extremities: No redness, or edema.     INR  therapeutic.    Denies signs/symptoms of bleeding and/or thrombosis.    Denies changes to diet or medications.   Follow up appt in 3 weeks       Medication: Warfarin (Coumadin)     Sunday Monday Tuesday Wednesday Thursday Friday Saturday      4 mg    4 mg    8 mg    4 mg    8 mg    4 mg    8 mg      1 tab(s)    1 tab(s)    2 tab(s)    1 tab(s)    2 tab(s)    1 tab(s)  2 tab(s)     Next Appointment: Tuesday, 2/21/2017 1:45pm      Review all of your home  medications and newly ordered medications with your doctor and / or pharmacist. Follow medication instructions as directed by your doctor and / or pharmacist. Please keep your medication list with you and share with your physician. Update the information when medications are discontinued, doses are changed, or new medications (including over-the-counter products) are added; and carry medication information at all times in the event of emergency situations.      For questions, please contact Outpatient Anticoagulation Service [phone].     Felipe Anaya, NIRD

## 2017-02-01 DIAGNOSIS — I48.91 ATRIAL FIBRILLATION, UNSPECIFIED TYPE (HCC): ICD-10-CM

## 2017-02-09 ENCOUNTER — OFFICE VISIT (OUTPATIENT)
Dept: CARDIOLOGY | Facility: PHYSICIAN GROUP | Age: 82
End: 2017-02-09
Payer: MEDICARE

## 2017-02-09 VITALS
HEART RATE: 88 BPM | WEIGHT: 139 LBS | OXYGEN SATURATION: 97 % | DIASTOLIC BLOOD PRESSURE: 70 MMHG | HEIGHT: 65 IN | SYSTOLIC BLOOD PRESSURE: 104 MMHG | BODY MASS INDEX: 23.16 KG/M2

## 2017-02-09 DIAGNOSIS — Z95.2 S/P TAVR (TRANSCATHETER AORTIC VALVE REPLACEMENT): ICD-10-CM

## 2017-02-09 DIAGNOSIS — I10 ESSENTIAL HYPERTENSION, MALIGNANT: ICD-10-CM

## 2017-02-09 DIAGNOSIS — I25.10 CORONARY ARTERY DISEASE INVOLVING NATIVE CORONARY ARTERY OF NATIVE HEART WITHOUT ANGINA PECTORIS: ICD-10-CM

## 2017-02-09 DIAGNOSIS — Z95.1 HX OF CABG: ICD-10-CM

## 2017-02-09 DIAGNOSIS — Z79.01 CHRONIC ANTICOAGULATION: ICD-10-CM

## 2017-02-09 DIAGNOSIS — N18.6 ESRD (END STAGE RENAL DISEASE) (HCC): ICD-10-CM

## 2017-02-09 DIAGNOSIS — I48.20 CHRONIC ATRIAL FIBRILLATION (HCC): ICD-10-CM

## 2017-02-09 DIAGNOSIS — I48.91 ATRIAL FIBRILLATION, UNSPECIFIED TYPE (HCC): ICD-10-CM

## 2017-02-09 PROCEDURE — 1101F PT FALLS ASSESS-DOCD LE1/YR: CPT | Mod: 8P | Performed by: INTERNAL MEDICINE

## 2017-02-09 PROCEDURE — G8420 CALC BMI NORM PARAMETERS: HCPCS | Performed by: INTERNAL MEDICINE

## 2017-02-09 PROCEDURE — 99215 OFFICE O/P EST HI 40 MIN: CPT | Performed by: INTERNAL MEDICINE

## 2017-02-09 PROCEDURE — G8432 DEP SCR NOT DOC, RNG: HCPCS | Performed by: INTERNAL MEDICINE

## 2017-02-09 PROCEDURE — 1036F TOBACCO NON-USER: CPT | Performed by: INTERNAL MEDICINE

## 2017-02-09 PROCEDURE — 4040F PNEUMOC VAC/ADMIN/RCVD: CPT | Performed by: INTERNAL MEDICINE

## 2017-02-09 PROCEDURE — G8482 FLU IMMUNIZE ORDER/ADMIN: HCPCS | Performed by: INTERNAL MEDICINE

## 2017-02-09 PROCEDURE — G8598 ASA/ANTIPLAT THER USED: HCPCS | Performed by: INTERNAL MEDICINE

## 2017-02-09 NOTE — PROGRESS NOTES
Subjective:   Erasmo North is a 86 y.o. male who presents today for followup of atrial fibrillation, coronary artery disease, and AS. He unfortunately was diagnosed with right kidney cancer and is status post nephrectomy with Dr. Iker Bey and has been initiated on dialysis.     He is now s/p TAVR and feeling somewhat better, ongoing KAUR which is slightly improved. Testing revealed concomitant severe pulmonary diffusing capacity abnormality as well.    Past Medical History   Diagnosis Date   • Atrial fibrillation (CMS-HCC)    • Hypertension    • CAD (coronary artery disease) of bypass graft    • Osteoporosis    • Genital herpes    • S/P colonoscopy 11/2010   • Diverticulosis    • Colon polyps      sessile    • Hemorrhoids      internal   • Hiatal hernia    • Tachy-millicent syndrome    • Urothelial carcinoma of kidney (CMS-HCC) 2/22/2014     Bx right kidney 2/3/14   • Dialysis patient      Monday, Wednesday, Friday at [facility] in [place]   • Arthritis    • Myocardial infarct (CMS-HCC) 1971   • Bowel habit changes      occasional diarrhea   • Dental disorder      upper/lower   • Breath shortness      with exertion   • Cataract      surgery bilateral IOL   • Urothelial carcinoma (CMS-HCC) 2/22/2014     Of the bladder; bx 2/3/14; Dr. Bey, pt states right kidney   • Aortic stenosis, severe    • Psoriasis    • Dialysis AV fistula infection (CMS-HCC)      right arm     Past Surgical History   Procedure Laterality Date   • Coronary artery bypass, 3  1974, 1983      had 2 times   • Hernia repair  1999   • Tonsillectomy     • Cath placement Left 8/31/2015     Procedure: CATH PLACEMENT, left IJ tunneled catheter;  Surgeon: Jaswinder Oneil M.D.;  Location: SURGERY [facility];  Service:    • Av fistula creation Right 9/8/2015     Procedure: AV FISTULA CREATION ARM;  Surgeon: Jaswinder Oneil M.D.;  Location: SURGERY [facility];  Service:    • Recovery  11/12/2015     Procedure: VASCULAR CASE-ANTHONY-RIGHT ARM FISTULOGRAM  WITH ANGIOPLASTY 45292, 87252, [id number] STAGE RENAL DISEASE N18.6;  Surgeon: [facility];  Location: SURGERY PRE-POST PROC UNIT [facility];  Service:    • Inguinal hernia repair  1/5/2012     Performed by ROSI PHILLIPS at SURGERY [facility]   • Exploratory laparotomy  1/8/2012     Performed by ROSI PHILLIPS at SURGERY [facility]   • Bowel resection  1/8/2012     Performed by ROSI PHILLIPS at SURGERY [facility]   • Other abdominal surgery  5/29/2015     right nephrectomy, [facility]   • Transcatheter aortic valve replacement  11/28/2016     Procedure: TRANSCATHETER AORTIC VALVE REPLACEMENT WITH CHAZ;  Surgeon: Mateo Perez M.D.;  Location: SURGERY [facility];  Service:      Family History   Problem Relation Age of Onset   • Heart Disease Mother      MI age 75 yo   • Heart Disease Brother      MI 38 yo   • Heart Disease Brother      MI 38 yo   • Cancer Neg Hx    • Diabetes Neg Hx      History   Smoking status   • Former Smoker -- 1.00 packs/day for 27 years   • Types: Cigarettes   • Quit date: 01/01/1974   Smokeless tobacco   • Never Used     Comment: started age 17, quit age 44     No Known Allergies  Outpatient Encounter Prescriptions as of 2/9/2017   Medication Sig Dispense Refill   • warfarin (COUMADIN) 4 MG Tab Take 2 Tabs by mouth every day. Or as directed 180 Tab 1   • omeprazole (PRILOSEC) 40 MG delayed-release capsule Take 40 mg by mouth every day.     • metoprolol SR (TOPROL XL) 25 MG TABLET SR 24 HR Take 25 mg by mouth every day.     • acetaminophen (TYLENOL) 325 MG Tab Take 325-650 mg by mouth every four hours as needed for Moderate Pain.     • warfarin (COUMADIN) 4 MG Tab Take 4-8 mg by mouth every day. Pt takes:  4MG on Sun,Mon,Wed, and Fri.  8MG on Tue, Thur, and Sat     • Multiple Vitamins-Minerals (CENTRUM ADULTS PO) Take 1 Tab by mouth every day.     • IRON PO Take 1 Tab by mouth every day.     • B Complex Vitamins (VITAMIN B COMPLEX PO) Take 1 Tab by mouth every  "day.     • Calcium Citrate-Vitamin D (CALCIUM + D PO) Take 1 Tab by mouth every day.     • atorvastatin (LIPITOR) 10 MG Tab Take 10 mg by mouth every evening.     • finasteride (PROSCAR) 5 MG Tab Take 5 mg by mouth every bedtime.       No facility-administered encounter medications on file as of 2/9/2017.     Today I reviewed the medical, surgical, social and family histories with the patient. As per HPI, otherwise noncontributory.    Review of Systems   All other systems reviewed and are negative.       Objective:   /70 mmHg  Pulse 88  Ht 1.638 m (5' 4.5\")  Wt 63.05 kg (139 lb)  BMI 23.50 kg/m2  SpO2 97%    Physical Exam   Constitutional: He is oriented to person, place, and time. He appears well-developed. No distress.   Thin but stable.  No cane or walker.  Good get up and go.   HENT:   Head: Normocephalic and atraumatic.   Mouth/Throat: Oropharynx is clear and moist.   Eyes: Conjunctivae are normal. Pupils are equal, round, and reactive to light. No scleral icterus.   Neck: No JVD present. No tracheal deviation present. No thyromegaly present.   Cardiovascular: Normal rate and intact distal pulses.  An irregular rhythm present. PMI is not displaced.  Exam reveals no gallop and no friction rub.    Murmur (Soft 1-2/6 WILFREDO) heard.  Pulses:       Radial pulses are 2+ on the right side, and 2+ on the left side.        Dorsalis pedis pulses are 2+ on the right side, and 2+ on the left side.   R arm fistula with thrill   Pulmonary/Chest: Effort normal and breath sounds normal. He has no wheezes. He has no rales.   Abdominal: Soft. Bowel sounds are normal. He exhibits no distension and no pulsatile midline mass. There is no tenderness. There is no guarding.   Musculoskeletal: He exhibits no edema.   Neurological: He is alert and oriented to person, place, and time. No cranial nerve deficit.   Skin: Skin is warm and dry. No rash noted. He is not diaphoretic. No erythema.   Psychiatric: He has a normal mood and " affect. His behavior is normal. Judgment and thought content normal.     CARDIAC STUDIES/PROCEDURES:    CARDIAC CATHETERIZATION CONCLUSIONS (11/10/16)  1.  Severe aortic stenosis with peak gradient of 30 mmHg, mean gradient of 19    mmHg, calculated ZEYAD of 0.99 cm2.  2.  Coronary artery disease including occluded ostial left main coronary    artery and proximal right coronary artery with patent left internal mammary    artery to the left anterior descending artery and saphenous vein graft to the    posterior descending artery, occluded single saphenous vein graft.  3.  Mildly reduced left ventricular systolic function with ejection fraction    of 50%.  4.  Moderately calcified trileaflet aortic valve, mild dilation of the    ascending aorta.  5.  Atherosclerotic distal abdominal aorta with small aneurysmal pouches,    severely calcified bilateral ostial to proximal common iliac system, right    greater than left and right iliacs also containing eccentric 40-50% stenosis.    CT OF CHEST AND ABDOMEN (11/04/16)  1. Tricuspid aortic valve with moderate calcifications.  Annulus area: 566-570 mm?  Annulus diameter: 23.7 x 29.3 mm  Diameter at the level of the sinuses: 38.2 x 38.4 x 39.2 mm  2. Access evaluation:  Mild scattered calcification and low density plaque.  Measurement, minimal luminal diameter:  Right mid external iliac artery:   8.4 mm  Left proximal external iliac artery: 8.0  mm  3. Postsurgical changes from CABG.  4. Small left pleural effusion, trace right pleural effusion with adjacent opacities, likely atelectasis.    CAROTID ULTRASOUND (11/08/16)    Carotid ultrasound showing mild to moderate carotid artery stenosis.    ECHOCARDIOGRAM CONCLUSIONS (11/29/16)  Normal left ventricular systolic function, EF 65%.   Mild concentric left ventricular hypertrophy.   Moderately dilated right ventricle.   Severely dilated left atrium.   Mitral annular calcification.   Mild mitral regurgitation.  Known TAVR aortic  valve that is functioning normally with normal   transvalvular gradients.    Transvalvular gradients are - Peak: 14 mmHg, Mean: 7 mmHg.   Trace paravalvular leak is noted.   Mild tricuspid regurgitation.   Right ventricular systolic pressure is estimated to be 35 mmHg.  Compared to the images of the study done 8/31/15 - there has been   interval placement of a bioprosthetic aortic valve that is functioning   normally and normalization of left ventricular ejection fraction.    ECHOCARDIOGRAM CONCLUSIONS at [facility] (07/05/16)  Echocardiogram showing ejection fraction of 50%, severe aortic stenosis with peak velocity of 4.01 m/s, peak gradient of 68 mmHg, mean gradient of 41mmHg and calculated ZEYAD of 0.6 cm2.    EKG performed on (11/29/16) was reviewed: EKG shows atrial fibrillation with right bundle branch block    Laboratory results of (11/29/16) were reviewed. Bun of 20 mg/dl, creatinine levels of 2.66 mg/dl noted.    PULMONARY FUNCTION INTERPRETATION (10/27/16)  1.  The mechanics of ventilation are normal.  2.  There is no improvement following the administration of an inhaled    bronchodilator.  Clinical improvement may occur in the absence of documented    spirometric improvement.  3.  Normal total lung capacity by body plethysmography.  This rules out    restrictive lung defect.  4.  Severely decreased diffusing capacity for carbon monoxide, 51% of    predicted; this indicates loss of alveolocapillary membrane surface area.  5.  Normal airway resistance.  6.  Review of the flow volume loop shows a normal tracing.  7.  Normal room air oximetry.    TRANSESOPHAGEAL ECHOCARDIOGRAM CONCLUSIONS (11/28/16)  TAVR case. Trileaflet aortic stenosi. PG only 18 . Post #29 TAVR with   pinpoint leak at 1 oclock in the .   Short axis. LVOT/ CW valve=12.2/30 c/w well functioning valve.  EF=50%.    Assessment:     1. Rate controlled atrial fibrillation, permanent  2. S/P TAVR (#29 Isabell S3)  11/29/2016  3. Coronary artery disease s/p CABG 1975 and 1984  4. Renal cancer, s/p R nephrectomy  5. Abnormal PFTs (51% deiffusing capacity)  6. History of tachycardia-bradycardia syndrome.  7. Hypertension well controlled  8. HDDRF  9. Chronic oral anticoagulation with coumadin    Medical Decision Making:  Today's Assessment / Status / Plan:     He is actually doing well from a frailty standpoint, ambulatory and NYHA class 1 KAUR related to his pulmonary issues and deconditioning. Discussed that he and his [facility] doctors should consider ambulatory oxygen for comfort. Offered to address this today with ambulatory saturation testing, he would like to defer to his other doctors.    Recommendations:    1. Continue medications as noted    2. Continue coumadin, INR 2-3.     3. Exercise    4. FU with pulmonary. Consider ambulatory O2    FU6M

## 2017-02-21 ENCOUNTER — ANTICOAGULATION MONITORING (OUTPATIENT)
Dept: VASCULAR LAB | Facility: MEDICAL CENTER | Age: 82
End: 2017-02-21

## 2017-02-21 ENCOUNTER — APPOINTMENT (OUTPATIENT)
Dept: VASCULAR LAB | Facility: MEDICAL CENTER | Age: 82
End: 2017-02-21
Payer: MEDICARE

## 2017-02-21 ENCOUNTER — TELEMEDICINE ORIGINATING SITE VISIT (OUTPATIENT)
Dept: MEDICAL GROUP | Facility: PHYSICIAN GROUP | Age: 82
End: 2017-02-21
Payer: MEDICARE

## 2017-02-21 DIAGNOSIS — I48.91 ATRIAL FIBRILLATION, UNSPECIFIED TYPE (HCC): ICD-10-CM

## 2017-02-21 DIAGNOSIS — Z79.01 CHRONIC ANTICOAGULATION: ICD-10-CM

## 2017-02-21 LAB
INR PPP: 5.6 (ref 2–3.5)
INR PPP: 5.6 (ref 2–3.5)

## 2017-02-21 NOTE — PROGRESS NOTES
OP Anticoagulation Service Note    Date: 2/21/2017       Anticoagulation Summary as of 2/21/2017     INR goal 2.0-3.0   Selected INR 5.6! (2/21/2017)   Maintenance plan 8 mg (4 mg x 2) on Tue, Thu, Sat; 4 mg (4 mg x 1) all other days   Weekly total 40 mg   Plan last modified Anabella Carvajal AMamiPMamiNMami (3/22/2016)   Next INR check 2/28/2017   Target end date Indefinite    Indications   Atrial fibrillation (HCC) [I48.91]         Anticoagulation Episode Summary     INR check location Outside Lab    Preferred lab     Send INR reminders to     Comments Please do not call before noon  [facility] for lab draws      Anticoagulation Care Providers     Provider Role Specialty Phone number    Jesús Bacon M.D. Referring Interventional Cardiology [phone]    Michael Morel, PHARMD Responsible          Anticoagulation Patient Findings   Negatives Missed Doses, Extra Doses, Medication Changes, Antibiotic Use, Diet Changes, Dental/Other Procedures, Hospitalization, Bleeding Gums, Nose Bleeds, Blood in Urine, Blood in Stool, Any Bruising, Other Complaints    Comments HR 66  /66  O2 99          THIS VISIT CONDUCTED WITH PRESENTER [employer] TE[employer] UTILIZING SECURE AND ENCRYPTED VIDEOCONFERENCING EQUIPMENT    ROS:    Pulm: Denies SOB, chest pain.    Card: Denies syncope, edema, palpitations.    Extremities: Denies redness, pain.     PE:    Pulm: No SOB, even and unlabored.    Card: Normal rate and rhythm.    Extremities: No redness, or edema.     INR  Supra-therapeutic.    Denies signs/symptoms of bleeding and/or thrombosis.    Denies changes to diet or medications.   Follow up appt in 1 weeks     Plan: hold two days then continue weekly warfarin dose as noted    Medication: Warfarin (Coumadin)     Sunday Monday Tuesday Wednesday Thursday Friday Saturday      4 mg    4 mg    8 mg    4 mg    8 mg    4 mg    8 mg      1 tab(s)    1 tab(s)    2 tab(s)    1 tab(s)    2 tab(s)    1 tab(s)  1 tab(s)     Next  Appointment: Tuesday, 2/28/2017 1:15pm     Review all of your home medications and newly ordered medications with your doctor and / or pharmacist. Follow medication instructions as directed by your doctor and / or pharmacist. Please keep your medication list with you and share with your physician. Update the information when medications are discontinued, doses are changed, or new medications (including over-the-counter products) are added; and carry medication information at all times in the event of emergency situations.      For questions, please contact Outpatient Anticoagulation Service [phone].     Felipe Anaya, PHARMD

## 2017-02-28 ENCOUNTER — NON-PROVIDER VISIT (OUTPATIENT)
Dept: MEDICAL GROUP | Facility: PHYSICIAN GROUP | Age: 82
End: 2017-02-28
Payer: MEDICARE

## 2017-02-28 ENCOUNTER — APPOINTMENT (OUTPATIENT)
Dept: VASCULAR LAB | Facility: MEDICAL CENTER | Age: 82
End: 2017-02-28
Payer: MEDICARE

## 2017-02-28 ENCOUNTER — ANTICOAGULATION MONITORING (OUTPATIENT)
Dept: VASCULAR LAB | Facility: MEDICAL CENTER | Age: 82
End: 2017-02-28

## 2017-02-28 VITALS — SYSTOLIC BLOOD PRESSURE: 98 MMHG | DIASTOLIC BLOOD PRESSURE: 48 MMHG | HEART RATE: 90 BPM

## 2017-02-28 DIAGNOSIS — Z79.01 CHRONIC ANTICOAGULATION: ICD-10-CM

## 2017-02-28 DIAGNOSIS — I48.91 ATRIAL FIBRILLATION, UNSPECIFIED TYPE (HCC): ICD-10-CM

## 2017-02-28 LAB
INR BLD: 2.5 (ref 0.9–1.2)
INR PPP: 2.5 (ref 2–3.5)
POC PROTIME: ABNORMAL

## 2017-02-28 PROCEDURE — 85610 PROTHROMBIN TIME: CPT | Performed by: PHYSICIAN ASSISTANT

## 2017-02-28 NOTE — PROGRESS NOTES
Anticoagulation Summary as of 2/28/2017     INR goal 2.0-3.0   Selected INR 2.5 (2/28/2017)   Maintenance plan 8 mg (4 mg x 2) on Tue, Thu, Sat; 4 mg (4 mg x 1) all other days   Weekly total 40 mg   No change documented Michael Morel, MUSA   Plan last modified TRACEY Romano (3/22/2016)   Next INR check 3/14/2017   Target end date Indefinite    Indications   Atrial fibrillation (HCC) [I48.91]         Anticoagulation Episode Summary     INR check location Outside Lab    Preferred lab     Send INR reminders to     Comments Please do not call before noon  [facility] for lab draws      Anticoagulation Care Providers     Provider Role Specialty Phone number    Jesús Bacon M.D. Referring Interventional Cardiology [phone]    Michael Morel, PHARMD Responsible          No current signs of bleeding or thrombosis. No interval medication changes. Continue current dose of warfarin. Follow up INR in 2 weeks.    Medication: Warfarin (Coumadin)     Sunday Monday Tuesday Wednesday Thursday Friday Saturday       4 mg     4 mg     8 mg     4 mg     8 mg     4 mg     8 mg       1 tab(s)     1 tab(s)     2 tab(s)     1 tab(s)     2 tab(s)     1 tab(s)   1 tab(s)      Next Appointment: Tuesday, 3/14/17/2017 at 2 pm    Review all of your home medications and newly ordered medications with your doctor and / or pharmacist. Follow medication instructions as directed by your doctor and / or pharmacist. Please keep your medication list with you and share with your physician. Update the information when medications are discontinued, doses are changed, or new medications (including over-the-counter products) are added; and carry medication information at all times in the event of emergency situations.      For questions, please contact Outpatient Anticoagulation Service [phone].             Michael Morel, NIRD

## 2017-03-14 ENCOUNTER — TELEMEDICINE ORIGINATING SITE VISIT (OUTPATIENT)
Dept: MEDICAL GROUP | Facility: PHYSICIAN GROUP | Age: 82
End: 2017-03-14
Payer: MEDICARE

## 2017-03-14 ENCOUNTER — APPOINTMENT (OUTPATIENT)
Dept: VASCULAR LAB | Facility: MEDICAL CENTER | Age: 82
End: 2017-03-14
Payer: MEDICARE

## 2017-03-14 ENCOUNTER — ANTICOAGULATION MONITORING (OUTPATIENT)
Dept: VASCULAR LAB | Facility: MEDICAL CENTER | Age: 82
End: 2017-03-14

## 2017-03-14 VITALS — HEART RATE: 82 BPM | DIASTOLIC BLOOD PRESSURE: 50 MMHG | SYSTOLIC BLOOD PRESSURE: 98 MMHG

## 2017-03-14 DIAGNOSIS — Z79.01 CHRONIC ANTICOAGULATION: ICD-10-CM

## 2017-03-14 DIAGNOSIS — I48.91 ATRIAL FIBRILLATION, UNSPECIFIED TYPE (HCC): ICD-10-CM

## 2017-03-14 LAB — INR PPP: 3.1 (ref 2–3.5)

## 2017-03-14 NOTE — PROGRESS NOTES
Anticoagulation Summary as of 3/14/2017     INR goal 2.0-3.0   Selected INR 3.1! (3/14/2017)   Maintenance plan 8 mg (4 mg x 2) on Thu, Sat; 4 mg (4 mg x 1) all other days   Weekly total 36 mg   Plan last modified Michael Morel PHARMD (3/14/2017)   Next INR check 3/28/2017   Target end date Indefinite    Indications   Atrial fibrillation (HCC) [I48.91]         Anticoagulation Episode Summary     INR check location Outside Lab    Preferred lab     Send INR reminders to     Comments Please do not call before noon  [facility] for lab draws      Anticoagulation Care Providers     Provider Role Specialty Phone number    Jesús Bacon M.D. Referring Interventional Cardiology [phone]    Michael Morel PHARMD Responsible          Anticoagulation Patient Findings    Medication: Warfarin (Coumadin)     Sunday Monday Tuesday Wednesday Thursday Friday Saturday        4 mg      4 mg      4 mg      4 mg      8 mg      4 mg      8 mg        1 tab(s)      1 tab(s)      1 tab(s)      1 tab(s)      2 tab(s)      1 tab(s)    1 tab(s)       Next Appointment: Tuesday, 3/28/17/2017 at 2 pm    Review all of your home medications and newly ordered medications with your doctor and / or pharmacist. Follow medication instructions as directed by your doctor and / or pharmacist. Please keep your medication list with you and share with your physician. Update the information when medications are discontinued, doses are changed, or new medications (including over-the-counter products) are added; and carry medication information at all times in the event of emergency situations.      For questions, please contact Outpatient Anticoagulation Service [phone].      Michael Morel, NIRD

## 2017-04-04 ENCOUNTER — ANTICOAGULATION MONITORING (OUTPATIENT)
Dept: VASCULAR LAB | Facility: MEDICAL CENTER | Age: 82
End: 2017-04-04

## 2017-04-04 ENCOUNTER — APPOINTMENT (OUTPATIENT)
Dept: VASCULAR LAB | Facility: MEDICAL CENTER | Age: 82
End: 2017-04-04
Payer: MEDICARE

## 2017-04-04 VITALS — SYSTOLIC BLOOD PRESSURE: 90 MMHG | DIASTOLIC BLOOD PRESSURE: 62 MMHG | RESPIRATION RATE: 93 BRPM | HEART RATE: 75 BPM

## 2017-04-04 DIAGNOSIS — I48.91 ATRIAL FIBRILLATION, UNSPECIFIED TYPE (HCC): ICD-10-CM

## 2017-04-04 LAB — INR PPP: 2.6 (ref 2–3.5)

## 2017-04-04 NOTE — PROGRESS NOTES
OP Anticoagulation Service Note    Date: 4/4/2017    Anticoagulation Summary as of 4/4/2017     INR goal 2.0-3.0   Selected INR 2.6 (4/4/2017)   Maintenance plan 8 mg (4 mg x 2) on Thu; 4 mg (4 mg x 1) all other days   Weekly total 32 mg   Plan last modified Felipe Anaya, PHARMD (4/4/2017)   Next INR check 4/25/2017   Target end date Indefinite    Indications   Atrial fibrillation (HCC) [I48.91]         Anticoagulation Episode Summary     INR check location Outside Lab    Preferred lab     Send INR reminders to     Comments Please do not call before noon  [facility] for lab draws      Anticoagulation Care Providers     Provider Role Specialty Phone number    Jesús Bacon M.D. Referring Interventional Cardiology [phone]    Michael Morel, PHARMD Responsible          Anticoagulation Patient Findings       THIS VISIT CONDUCTED WITH PRESENTER [employer] TELE[employer] UTILIZING SECURE AND ENCRYPTED VIDEOCONFERENCING EQUIPMENT    ROS:    Pulm: Denies SOB, chest pain.    Card: Denies syncope, edema, palpitations.    Extremities: Denies redness, pain.     PE:    Pulm: No SOB, even and unlabored.    Card: Normal rate and rhythm.    Extremities: No redness, or edema.     INR  therapeutic.    Denies signs/symptoms of bleeding and/or thrombosis.    Denies changes to diet or medications.   Follow up appt in 3weeks       Plan:  Continue weekly warfarin dose as noted      Medication: Warfarin (Coumadin)     Sunday Monday Tuesday Wednesday Thursday Friday Saturday      4mg    4mg    4mg    4mg    8 mg    4mg    4mg      1tab(s)    1tab(s)    1tab(s)    1tab(s)    2 tab(s)    1tab(s)  1tab(s)     Next Appointment: Tuesday, 4/25/2017 at 2pm      Review all of your home medications and newly ordered medications with your doctor and / or pharmacist. Follow medication instructions as directed by your doctor and / or pharmacist. Please keep your medication list with you and share with your physician. Update the  information when medications are discontinued, doses are changed, or new medications (including over-the-counter products) are added; and carry medication information at all times in the event of emergency situations.      For questions, please contact Outpatient Anticoagulation Service [phone].     Felipe Anaya, NIRD

## 2017-04-25 ENCOUNTER — APPOINTMENT (OUTPATIENT)
Dept: VASCULAR LAB | Facility: MEDICAL CENTER | Age: 82
End: 2017-04-25
Payer: MEDICARE

## 2017-04-25 ENCOUNTER — NON-PROVIDER VISIT (OUTPATIENT)
Dept: MEDICAL GROUP | Facility: PHYSICIAN GROUP | Age: 82
End: 2017-04-25
Payer: MEDICARE

## 2017-04-25 ENCOUNTER — ANTICOAGULATION MONITORING (OUTPATIENT)
Dept: VASCULAR LAB | Facility: MEDICAL CENTER | Age: 82
End: 2017-04-25

## 2017-04-25 ENCOUNTER — APPOINTMENT (OUTPATIENT)
Dept: ADMISSIONS | Facility: MEDICAL CENTER | Age: 82
End: 2017-04-25
Attending: SURGERY
Payer: MEDICARE

## 2017-04-25 DIAGNOSIS — Z79.01 CHRONIC ANTICOAGULATION: ICD-10-CM

## 2017-04-25 LAB
INR BLD: 3.8 (ref 0.9–1.2)
INR PPP: 3.8 (ref 2–3.5)
POC PROTIME: ABNORMAL

## 2017-04-25 PROCEDURE — 85610 PROTHROMBIN TIME: CPT | Performed by: PHYSICIAN ASSISTANT

## 2017-04-25 RX ORDER — SULFAMETHOXAZOLE AND TRIMETHOPRIM 800; 160 MG/1; MG/1
1 TABLET ORAL 2 TIMES DAILY
COMMUNITY

## 2017-04-25 NOTE — PROGRESS NOTES
Anticoagulation Summary as of 4/25/2017     INR goal 2.0-3.0   Selected INR 3.8! (4/25/2017)   Maintenance plan 8 mg (4 mg x 2) on Thu; 4 mg (4 mg x 1) all other days   Weekly total 32 mg   Plan last modified Felipe Anaya, PHARMD (4/4/2017)   Next INR check 5/9/2017   Target end date Indefinite    Indications   Atrial fibrillation (HCC) [I48.91]         Anticoagulation Episode Summary     INR check location Outside Lab    Preferred lab     Send INR reminders to     Comments Please do not call before noon  [facility] for lab draws      Anticoagulation Care Providers     Provider Role Specialty Phone number    Jesús Bacon M.D. Referring Interventional Cardiology [phone]    Michael Morel, PHARMD Responsible          Anticoagulation Patient Findings  States he has had some blood in his urine.  He started Bactrim 7 days ago and has 7 more day son therapy.  Will have pt hold today and begin 30% reduced regimen while on Bactrim.  Pt can't return for 2 weeks therefore the plan below was made and pt instructed to contact clinic if he continues bleeding for 2-3 more days.    OP Anticoagulation Service Note    ROS:    Pulm: Denies SOB, chest pain.    Card: Denies syncope, edema, palpitations.    E  xtremities: Denies redness, pain.     PE:    Pulm: No SOB, even and unlabored.     Extremities: No redness, or edema.     Tomorrow: NO WARFARIN  Then follow the regimen below.  Medication: Warfarin (Coumadin)     Sunday Monday Tuesday Wednesday Thursday Friday Saturday       4mg     2mg     4mg     2mg     4 mg     2mg     4mg       1tab(s)     0.5tab(s)     1tab(s)     0.5tab(s)     1 tab(s)     0.5tab(s)   1tab(s)            After pt has finished bactrim he is to begin the following regimen    Medication: Warfarin (Coumadin)     Sunday Monday Tuesday Wednesday Thursday Friday Saturday       4mg     4mg     4mg     4mg     8 mg     4mg     4mg       1tab(s)     1tab(s)      1tab(s)     1tab(s)     2 tab(s)     1tab(s)   1tab(s)                  Next Appointment: Monday, 5/9/17 at 2:15     Review all of your home medications and newly ordered medications with your doctor and / or pharmacist. Follow medication instructions as directed by your doctor and / or pharmacist. Please keep your medication list with you and share with your physician. Update the information when medications are discontinued, doses are changed, or new medications (including over-the-counter products) are added; and carry medication information at all times in the event of emergency situations.      For questions, please contact Outpatient Anticoagulation Service [phone].     Jeremi Bay, PHARMD

## 2017-04-25 NOTE — OR NURSING
Spoke to Yoselin/Dr Lugo's office regarding instructions for warfarin for scheduled procedure 4.27.17; per Yoselin pt is to cont warfarin/ do not stop for procedure scheduled 4.27.17; per verb understanding.

## 2017-04-27 ENCOUNTER — APPOINTMENT (OUTPATIENT)
Dept: RADIOLOGY | Facility: MEDICAL CENTER | Age: 82
End: 2017-04-27
Attending: SURGERY
Payer: MEDICARE

## 2017-04-27 ENCOUNTER — HOSPITAL ENCOUNTER (OUTPATIENT)
Facility: MEDICAL CENTER | Age: 82
End: 2017-04-27
Attending: SURGERY | Admitting: SURGERY
Payer: MEDICARE

## 2017-04-27 VITALS
RESPIRATION RATE: 16 BRPM | SYSTOLIC BLOOD PRESSURE: 127 MMHG | HEIGHT: 65 IN | TEMPERATURE: 97.2 F | DIASTOLIC BLOOD PRESSURE: 61 MMHG | BODY MASS INDEX: 22.99 KG/M2 | WEIGHT: 138 LBS | OXYGEN SATURATION: 90 % | HEART RATE: 75 BPM

## 2017-04-27 DIAGNOSIS — N18.6 END STAGE RENAL DISEASE (HCC): ICD-10-CM

## 2017-04-27 LAB
ANION GAP SERPL CALC-SCNC: 11 MMOL/L (ref 0–11.9)
BASOPHILS # BLD AUTO: 0.6 % (ref 0–1.8)
BASOPHILS # BLD: 0.05 K/UL (ref 0–0.12)
BUN SERPL-MCNC: 35 MG/DL (ref 8–22)
CALCIUM SERPL-MCNC: 9.2 MG/DL (ref 8.5–10.5)
CHLORIDE SERPL-SCNC: 96 MMOL/L (ref 96–112)
CO2 SERPL-SCNC: 29 MMOL/L (ref 20–33)
CREAT SERPL-MCNC: 3.75 MG/DL (ref 0.5–1.4)
EOSINOPHIL # BLD AUTO: 0.08 K/UL (ref 0–0.51)
EOSINOPHIL NFR BLD: 0.9 % (ref 0–6.9)
ERYTHROCYTE [DISTWIDTH] IN BLOOD BY AUTOMATED COUNT: 49 FL (ref 35.9–50)
GFR SERPL CREATININE-BSD FRML MDRD: 15 ML/MIN/1.73 M 2
GLUCOSE SERPL-MCNC: 90 MG/DL (ref 65–99)
HCT VFR BLD AUTO: 32.8 % (ref 42–52)
HGB BLD-MCNC: 10.7 G/DL (ref 14–18)
IMM GRANULOCYTES # BLD AUTO: 0.07 K/UL (ref 0–0.11)
IMM GRANULOCYTES NFR BLD AUTO: 0.8 % (ref 0–0.9)
LYMPHOCYTES # BLD AUTO: 1.32 K/UL (ref 1–4.8)
LYMPHOCYTES NFR BLD: 15.1 % (ref 22–41)
MCH RBC QN AUTO: 30.8 PG (ref 27–33)
MCHC RBC AUTO-ENTMCNC: 32.6 G/DL (ref 33.7–35.3)
MCV RBC AUTO: 94.5 FL (ref 81.4–97.8)
MONOCYTES # BLD AUTO: 0.98 K/UL (ref 0–0.85)
MONOCYTES NFR BLD AUTO: 11.2 % (ref 0–13.4)
NEUTROPHILS # BLD AUTO: 6.24 K/UL (ref 1.82–7.42)
NEUTROPHILS NFR BLD: 71.4 % (ref 44–72)
NRBC # BLD AUTO: 0 K/UL
NRBC BLD AUTO-RTO: 0 /100 WBC
PLATELET # BLD AUTO: 161 K/UL (ref 164–446)
PMV BLD AUTO: 10 FL (ref 9–12.9)
POTASSIUM SERPL-SCNC: 4.1 MMOL/L (ref 3.6–5.5)
RBC # BLD AUTO: 3.47 M/UL (ref 4.7–6.1)
SODIUM SERPL-SCNC: 136 MMOL/L (ref 135–145)
WBC # BLD AUTO: 8.7 K/UL (ref 4.8–10.8)

## 2017-04-27 PROCEDURE — 700117 HCHG RX CONTRAST REV CODE 255: Performed by: SURGERY

## 2017-04-27 PROCEDURE — 700111 HCHG RX REV CODE 636 W/ 250 OVERRIDE (IP)

## 2017-04-27 PROCEDURE — 36902 INTRO CATH DIALYSIS CIRCUIT: CPT

## 2017-04-27 PROCEDURE — 700102 HCHG RX REV CODE 250 W/ 637 OVERRIDE(OP)

## 2017-04-27 PROCEDURE — 700102 HCHG RX REV CODE 250 W/ 637 OVERRIDE(OP): Performed by: SURGERY

## 2017-04-27 PROCEDURE — 99153 MOD SED SAME PHYS/QHP EA: CPT

## 2017-04-27 PROCEDURE — 85025 COMPLETE CBC W/AUTO DIFF WBC: CPT

## 2017-04-27 PROCEDURE — 700111 HCHG RX REV CODE 636 W/ 250 OVERRIDE (IP): Performed by: SURGERY

## 2017-04-27 PROCEDURE — 80048 BASIC METABOLIC PNL TOTAL CA: CPT

## 2017-04-27 PROCEDURE — A9270 NON-COVERED ITEM OR SERVICE: HCPCS

## 2017-04-27 PROCEDURE — A9270 NON-COVERED ITEM OR SERVICE: HCPCS | Performed by: SURGERY

## 2017-04-27 RX ORDER — ONDANSETRON 2 MG/ML
4 INJECTION INTRAMUSCULAR; INTRAVENOUS EVERY 4 HOURS PRN
Status: DISCONTINUED | OUTPATIENT
Start: 2017-04-27 | End: 2017-04-27 | Stop reason: HOSPADM

## 2017-04-27 RX ORDER — ACETAMINOPHEN 325 MG/1
650 TABLET ORAL EVERY 4 HOURS PRN
Status: DISCONTINUED | OUTPATIENT
Start: 2017-04-27 | End: 2017-04-27 | Stop reason: HOSPADM

## 2017-04-27 RX ORDER — ATORVASTATIN CALCIUM 10 MG/1
10 TABLET, FILM COATED ORAL NIGHTLY
Status: DISCONTINUED | OUTPATIENT
Start: 2017-04-27 | End: 2017-04-27 | Stop reason: HOSPADM

## 2017-04-27 RX ORDER — MULTIVITAMIN/IRON/FOLIC ACID 18MG-0.4MG
1 TABLET ORAL DAILY
Status: DISCONTINUED | OUTPATIENT
Start: 2017-04-27 | End: 2017-04-27

## 2017-04-27 RX ORDER — ACETAMINOPHEN 325 MG/1
325 TABLET ORAL EVERY 4 HOURS PRN
Status: DISCONTINUED | OUTPATIENT
Start: 2017-04-27 | End: 2017-04-27 | Stop reason: HOSPADM

## 2017-04-27 RX ORDER — SODIUM CHLORIDE 9 MG/ML
500 INJECTION, SOLUTION INTRAVENOUS
Status: DISCONTINUED | OUTPATIENT
Start: 2017-04-27 | End: 2017-04-27

## 2017-04-27 RX ORDER — B-COMPLEX WITH VITAMIN C
1 TABLET ORAL DAILY
Status: DISCONTINUED | OUTPATIENT
Start: 2017-04-27 | End: 2017-04-27

## 2017-04-27 RX ORDER — ACETAMINOPHEN 325 MG/1
325-650 TABLET ORAL EVERY 4 HOURS PRN
Status: DISCONTINUED | OUTPATIENT
Start: 2017-04-27 | End: 2017-04-27

## 2017-04-27 RX ORDER — HYDROCODONE BITARTRATE AND ACETAMINOPHEN 5; 325 MG/1; MG/1
1 TABLET ORAL EVERY 4 HOURS PRN
Status: DISCONTINUED | OUTPATIENT
Start: 2017-04-27 | End: 2017-04-27 | Stop reason: HOSPADM

## 2017-04-27 RX ORDER — IODIXANOL 270 MG/ML
36 INJECTION, SOLUTION INTRAVASCULAR ONCE
Status: COMPLETED | OUTPATIENT
Start: 2017-04-27 | End: 2017-04-27

## 2017-04-27 RX ORDER — MIDAZOLAM HYDROCHLORIDE 1 MG/ML
INJECTION INTRAMUSCULAR; INTRAVENOUS
Status: COMPLETED
Start: 2017-04-27 | End: 2017-04-27

## 2017-04-27 RX ORDER — WARFARIN SODIUM 4 MG/1
4 TABLET ORAL DAILY
Status: DISCONTINUED | OUTPATIENT
Start: 2017-04-27 | End: 2017-04-27 | Stop reason: HOSPADM

## 2017-04-27 RX ORDER — METOPROLOL SUCCINATE 25 MG/1
25 TABLET, EXTENDED RELEASE ORAL DAILY
Status: DISCONTINUED | OUTPATIENT
Start: 2017-04-27 | End: 2017-04-27 | Stop reason: HOSPADM

## 2017-04-27 RX ORDER — MIDAZOLAM HYDROCHLORIDE 1 MG/ML
.5-2 INJECTION INTRAMUSCULAR; INTRAVENOUS PRN
Status: DISCONTINUED | OUTPATIENT
Start: 2017-04-27 | End: 2017-04-27

## 2017-04-27 RX ORDER — OMEPRAZOLE 20 MG/1
40 CAPSULE, DELAYED RELEASE ORAL DAILY
Status: DISCONTINUED | OUTPATIENT
Start: 2017-04-28 | End: 2017-04-27 | Stop reason: HOSPADM

## 2017-04-27 RX ORDER — SODIUM CHLORIDE 9 MG/ML
INJECTION, SOLUTION INTRAVENOUS CONTINUOUS
Status: DISCONTINUED | OUTPATIENT
Start: 2017-04-27 | End: 2017-04-27 | Stop reason: HOSPADM

## 2017-04-27 RX ORDER — PNV NO.95/FERROUS FUM/FOLIC AC 28MG-0.8MG
1 TABLET ORAL DAILY
Status: DISCONTINUED | OUTPATIENT
Start: 2017-04-27 | End: 2017-04-27

## 2017-04-27 RX ORDER — SULFAMETHOXAZOLE AND TRIMETHOPRIM 800; 160 MG/1; MG/1
1 TABLET ORAL DAILY
Status: DISCONTINUED | OUTPATIENT
Start: 2017-04-27 | End: 2017-04-27 | Stop reason: HOSPADM

## 2017-04-27 RX ORDER — FINASTERIDE 5 MG/1
5 TABLET, FILM COATED ORAL
Status: DISCONTINUED | OUTPATIENT
Start: 2017-04-27 | End: 2017-04-27 | Stop reason: HOSPADM

## 2017-04-27 RX ADMIN — SODIUM CHLORIDE: 9 INJECTION, SOLUTION INTRAVENOUS at 12:45

## 2017-04-27 RX ADMIN — FENTANYL CITRATE 25 MCG: 50 INJECTION, SOLUTION INTRAMUSCULAR; INTRAVENOUS at 11:06

## 2017-04-27 RX ADMIN — MIDAZOLAM 1 MG: 1 INJECTION INTRAMUSCULAR; INTRAVENOUS at 11:24

## 2017-04-27 RX ADMIN — MIDAZOLAM 1 MG: 1 INJECTION INTRAMUSCULAR; INTRAVENOUS at 11:06

## 2017-04-27 RX ADMIN — FENTANYL CITRATE 25 MCG: 50 INJECTION, SOLUTION INTRAMUSCULAR; INTRAVENOUS at 11:24

## 2017-04-27 RX ADMIN — IODIXANOL 36 ML: 270 INJECTION, SOLUTION INTRAVASCULAR at 12:14

## 2017-04-27 RX ADMIN — FENTANYL CITRATE 25 MCG: 50 INJECTION, SOLUTION INTRAMUSCULAR; INTRAVENOUS at 11:49

## 2017-04-27 ASSESSMENT — PAIN SCALES - GENERAL
PAINLEVEL_OUTOF10: 0

## 2017-04-27 NOTE — IP AVS SNAPSHOT
" Home Care Instructions                                                                                                                Name:Erasmo North  Medical Record Number:[id number]  CSN: [id number]    YOB: 1930   Age: 86 y.o.  Sex: male  HT:1.651 m (5' 5\") WT: 62.596 kg (138 lb)          Admit Date: 4/27/2017     Discharge Date:   Today's Date: 4/27/2017  Attending Doctor:  Narcisa Sullivan M.D.                  Allergies:  Review of patient's allergies indicates no known allergies.                Discharge Instructions         ACTIVITY: Rest and take it easy for the first 24 hours.  A responsible adult is recommended to remain with you during that time.  It is normal to feel sleepy.  We encourage you to not do anything that requires balance, judgment or coordination.    MILD FLU-LIKE SYMPTOMS ARE NORMAL. YOU MAY EXPERIENCE GENERALIZED MUSCLE ACHES, THROAT IRRITATION, HEADACHE AND/OR SOME NAUSEA.    FOR 24 HOURS DO NOT:  Drive, operate machinery or run household appliances.  Drink beer or alcoholic beverages.   Make important decisions or sign legal documents.    SPECIAL INSTRUCTIONS: *KEEP INCISION DRY FOR 24 HRS**    DIET: To avoid nausea, slowly advance diet as tolerated, avoiding spicy or greasy foods for the first day.  Add more substantial food to your diet according to your physician's instructions.  Babies can be fed formula or breast milk as soon as they are hungry.  INCREASE FLUIDS AND FIBER TO AVOID CONSTIPATION.    SURGICAL DRESSING/BATHING: *MAY SHOWER TOMORROW AFTERNOON THEN MAY REMOVE BAND-AID**    FOLLOW-UP APPOINTMENT:  A follow-up appointment should be arranged with your doctor in *PLEASE KEEP UP WITH DIALYSIS APPT .  NO NEED TO FOLLOW-UP WITH DR SULLIVAN **; call to schedule.    You should CALL YOUR PHYSICIAN if you develop:  Fever greater than 101 degrees F.  Pain not relieved by medication, or persistent nausea or vomiting.  Excessive bleeding (blood soaking through " dressing) or unexpected drainage from the wound.  Extreme redness or swelling around the incision site, drainage of pus or foul smelling drainage.  Inability to urinate or empty your bladder within 8 hours.  Problems with breathing or chest pain.    You should call 911 if you develop problems with breathing or chest pain.  If you are unable to contact your doctor or surgical center, you should go to the nearest emergency room or urgent care center.  Physician's telephone #: *DR SULLIVAN  [phone] **    If any questions arise, call your doctor.  If your doctor is not available, please feel free to call the Surgical Center at [phone].  The Center is open Monday through Friday from 7AM to 7PM.  You can also call the HEALTH HOTLINE open 24 hours/day, 7 days/week and speak to a nurse at [phone], or toll free at [phone].    A registered nurse may call you a few days after your surgery to see how you are doing after your procedure.    MEDICATIONS: Resume taking daily medication.  Take prescribed pain medication with food.  If no medication is prescribed, you may take non-aspirin pain medication if needed.  PAIN MEDICATION CAN BE VERY CONSTIPATING.  Take a stool softener or laxative such as senokot, pericolace, or milk of magnesia if needed.      If your physician has prescribed pain medication that includes Acetaminophen (Tylenol), do not take additional Acetaminophen (Tylenol) while taking the prescribed medication.    Depression / Suicide Risk    As you are discharged from this [facility] facility, it is important to learn how to keep safe from harming yourself.    Recognize the warning signs:  · Abrupt changes in personality, positive or negative- including increase in energy   · Giving away possessions  · Change in eating patterns- significant weight changes-  positive or negative  · Change in sleeping patterns- unable to sleep or sleeping all the time   · Unwillingness or inability to  communicate  · Depression  · Unusual sadness, discouragement and loneliness  · Talk of wanting to die  · Neglect of personal appearance   · Rebelliousness- reckless behavior  · Withdrawal from people/activities they love  · Confusion- inability to concentrate     If you or a loved one observes any of these behaviors or has concerns about self-harm, here's what you can do:  · Talk about it- your feelings and reasons for harming yourself  · Remove any means that you might use to hurt yourself (examples: pills, rope, extension cords, firearm)  · Get professional help from the community (Mental Health, Substance Abuse, psychological counseling)  · Do not be alone:Call your Safe Contact- someone whom you trust who will be there for you.  · Call your local CRISIS HOTLINE 639-5877 or 286-305-7198  · Call your local Children's Mobile Crisis Response Team [place] (932) 318-1471 or www.Spot [employer]  · Call the toll free National Suicide Prevention Hotlines   · National Suicide Prevention Lifeline 743-257-BXVO (3543)  · [employer] Hope Line Network 800-SUICIDE (489-2839)       Medication List      ASK your doctor about these medications        Instructions    Morning Afternoon Evening Bedtime    acetaminophen 325 MG Tabs   Commonly known as:  TYLENOL        Take 325-650 mg by mouth every four hours as needed for Moderate Pain.   Dose:  325-650 mg                        atorvastatin 10 MG Tabs   Commonly known as:  LIPITOR        Take 10 mg by mouth every evening.   Dose:  10 mg                        CALCIUM + D PO        Take 1 Tab by mouth every day.   Dose:  1 Tab                        CENTRUM ADULTS PO        Take 1 Tab by mouth every day.   Dose:  1 Tab                        finasteride 5 MG Tabs   Commonly known as:  PROSCAR        Take 5 mg by mouth every bedtime.   Dose:  5 mg                        IRON PO        Take 1 Tab by mouth every day.   Dose:  1 Tab                        metoprolol SR 25 MG Tb24      Commonly known as:  TOPROL XL        Take 25 mg by mouth every day.   Dose:  25 mg                        omeprazole 40 MG delayed-release capsule   Commonly known as:  PRILOSEC        Take 40 mg by mouth every day.   Dose:  40 mg                        sulfamethoxazole-trimethoprim 800-160 MG tablet   Commonly known as:  BACTRIM DS        Take 1 Tab by mouth 2 times a day.   Dose:  1 Tab                        VITAMIN B COMPLEX PO        Take 1 Tab by mouth every day.   Dose:  1 Tab                        warfarin 4 MG Tabs   Commonly known as:  COUMADIN        Take 4 mg by mouth every day. Pt takes: 4MG  8MG Thur Per pt report instructed to continue/do not stop for scheduled procedure 4.27.17 per Dr Lugo; will not take am 4.27.17   Dose:  4 mg                                Medication Information     Above and/or attached are the medications your physician expects you to take upon discharge. Review all of your home medications and newly ordered medications with your doctor and/or pharmacist. Follow medication instructions as directed by your doctor and/or pharmacist. Please keep your medication list with you and share with your physician. Update the information when medications are discontinued, doses are changed, or new medications (including over-the-counter products) are added; and carry medication information at all times in the event of emergency situations.        Resources     Quit Smoking / Tobacco Use:    I understand the use of any tobacco products increases my chance of suffering from future heart disease or stroke and could cause other illnesses which may shorten my life. Quitting the use of tobacco products is the single most important thing I can do to improve my health. For further information on smoking / tobacco cessation call a Toll Free Quit Line at 1-724.804.2147 (*National [employer] [place]) or 1-447.623.5292 (American Lung Association) or you can access the web based program at  www.lungusa.org.    [place] Tobacco Users Help Line:  (499) 539-2807       Toll Free: 1-806.994.2263  Quit Tobacco Program [facility] (035)795-3408    Crisis Hotline:    [place] Crisis Hotline:  8-221-UWJFMGO or 1-825.505.6820    [place] Crisis Hotline:    1-433.286.3435 or 710-609-1253    Discharge Survey:   Thank you for choosing [facility]. We hope we did everything we could to make your hospital stay a pleasant one. You may be receiving a survey and we would appreciate your time and participation in answering the questions. Your input is very valuable to us in our efforts to improve our service to our patients and their families.            Signatures     My signature on this form indicates that:    1. I acknowledge receipt and understanding of these Home Care Instruction.  2. My questions regarding this information have been answered to my satisfaction.  3. I have formulated a plan with my discharge nurse to obtain my prescribed medications for home.    __________________________________      __________________________________                   Patient Signature                                 Guardian/Responsible Adult Signature      __________________________________                 __________       ________                       Nurse Signature                                               Date                 Time

## 2017-04-27 NOTE — OP REPORT
DATE OF SERVICE:  04/27/2017    PREOPERATIVE DIAGNOSIS:  Right arm fistula dysfunction.    POSTPROCEDURE DIAGNOSIS:  Right arm fistula dysfunction.    PROCEDURES PERFORMED:  1.  Right arm fistulogram with ultrasound access.  2.  Angioplasty right arm arteriovenous fistula with 6 mm cutting and 8 mm   standard balloons.    SURGEON:  Narcisa Lugo MD    ANESTHESIOLOGIST:  Edward Addison RN    ANESTHESIA:  Conscious sedation.    INDICATIONS:  The patient is an 86-year-old gentleman who presents with   stenosis in his fistula.  He is brought to the angio suite today to undergo   intervention to improve flow.  Risks and benefits were explained to the   patient and include bleeding, infection, fistula thrombosis, and trauma   requiring surgery.  He understands and agrees to proceed.  The patient is on   chronic Coumadin, and undergoes regular dialysis while on Coumadin and   therefore, should be able to tolerate intervention while continuing his   anticoagulation.    DESCRIPTION OF PROCEDURE:  Patient was taken to the angio suite and placed in   a supine position.  After adequate sedation, the right upper extremity was   prepped circumferentially.  Cloth towels and paper drapes were placed.  Access   to the fistula was obtained just above the arteriovenous anastomosis after   administering local anesthesia.  Micropuncture needle was inserted followed by   a 0.018 wire and then, a micropuncture sheath was placed.  Flow through the   fistula was assessed and showed no evidence of fistula or central venous   stenosis through the cephalic vein, the cephalic arch, axillary and subclavian   vein into the superior vena cava.    I then used ultrasound to obtain access to the cephalic vein in the upper arm.    Micropuncture needle was placed followed by a 0.018 wire.  The micropuncture   sheath was used to exchange for a 4-Pakistani sheath.  I then used a 0.018 wire   to attempt to gain access through the arteriovenous  anastomosis.  Until we   applied manual pressure to limit flow, utilizing the technician's hand as well   as my own, I was unable to gain access through the arteriovenous anastomosis.    Once this maneuver was performed however, the wire went right through the   anastomosis.  The Kumpe catheter was placed into the artery and the 0.018 wire   was used to place a 6 mm balloon.  The 6 mm balloon was inflated at the vein   just above the arteriovenous anastomosis and showed no evidence of wasting.  I   advanced to an 8 mm balloon and this was difficult to inflate fully but,   after waiting I got near complete full inflation.    In order to perform the best procedure with the least trauma, I opted to   exchange the 0.018 wire for a 0.035 wire and then exchanged the 4-Peruvian   sheath for a 6-Peruvian sheath.  This allowed no change in wire position,   exchanging the 0.035 wire for a 0.018 wire.  I then used a 6 mm cutting   balloon to perform angioplasty, rotating the balloon in 3 or 4 times to   achieve the maximum benefit of the cutter.  We then inflated the 8 mm balloon   which easily inflated to full inflation without evidence of persistent   residual stenosis.  Final imaging still showed the evidence of vasospasms and   residual stenosis but, functionally the fistula had greatly improved flow.    Wires and catheters were removed, the sheath removed and pressure held without   incident.  There were no apparent complications and the patient will be   discharged in stable condition.       ____________________________________     MD RAMILA Palomino / [place]    DD:  04/27/2017 12:26:43  DT:  04/27/2017 13:05:36    D#:  [id number]  Job#:  [id number]

## 2017-04-27 NOTE — IP AVS SNAPSHOT
4/27/2017    Erasmo North  [address]  [place] [address]    Dear Erasmo:    [facility] wants to ensure your discharge home is safe and you or your loved ones have had all of your questions answered regarding your care after you leave the hospital.    Below is a list of resources and contact information should you have any questions regarding your hospital stay, follow-up instructions, or active medical symptoms.    Questions or Concerns Regarding… Contact   Medical Questions Related to Your Discharge  (7 days a week, 8am-5pm) Contact a Nurse Care Coordinator   [phone]   Medical Questions Not Related to Your Discharge  (24 hours a day / 7 days a week)  Contact the Nurse Health Line   [phone]    Medications or Discharge Instructions Refer to your discharge packet   or contact your [facility] Primary Care Provider   [phone]   Follow-up Appointment(s) Schedule your appointment via [employer]   or contact Scheduling [phone]   Billing Review your statement via [employer]  or contact Billing [phone]   Medical Records Review your records via [employer]   or contact Medical Records [phone]     You may receive a telephone call within two days of discharge. This call is to make certain you understand your discharge instructions and have the opportunity to have any questions answered. You can also easily access your medical information, test results and upcoming appointments via the [employer] free online health management tool. You can learn more and sign up at [facility]/[employer]. For assistance setting up your [employer] account, please call [phone].    Once again, we want to ensure your discharge home is safe and that you have a clear understanding of any next steps in your care. If you have any questions or concerns, please do not hesitate to contact us, we are here for you. Thank you for choosing [facility] for your healthcare needs.    Sincerely,    Your [facility] Healthcare Team

## 2017-04-27 NOTE — OR SURGEON
Immediate Post-Operative Note      PreOp Diagnosis: Right arm fistula stenosis    PostOp Diagnosis: Same    Procedure(s):RIGHT ARM FISTULOGRAM WITH ANGIOPLASTY - 6mm cutting and 8mm standard  Surgeon(s):  Recoveryonly Surgery    Anesthesiologist/Type of Anesthesia:Conscious sedation, Edward Iraheta RN    Estimated Blood Loss: min    Findings: Stenosis, relative at just beyond the arterial anastomosis    Complications: none    [id number]    4/27/2017 12:19 PM Narcisa Lugo

## 2017-04-27 NOTE — PROGRESS NOTES
IR Procedure Note:    Dr. Lugo performed a right arm fistulogram with angioplasty.  MD made aware of INR of 3.8 from 4/25/2017, no orders received.  Pt tolerated the procedure well, vital signs stable.  Gauze and tegaderm applied to right forearm, pressure held x 10 minutes.  Report given to Silvia MARC.  Pt transported back to [facility].

## 2017-04-27 NOTE — OR NURSING
1232  PATIENT RECEIVED FROM IR,  S/P RIGHT ARM FISTULOGRAM.  PATIENT IS A/A/OX4.  DENIES ANY PAIN.  RIGHT ARM WRAPPED IN WARM BLANKET AND ELEVATED ON PILLOW.      1300   RIGHT ARM SITE IS SOFT,  CLEAR WITH DRESSING INTACT.  PATIENT TAKING PO FLUID WELL.    1430   D/C INSTRUCTIONS GIVEN TO PATIENT.  NO FAMILY IS HERE.  PATIENT VERBALIZED OF UNDERSTANDING OF ALL INSTRUCTIONS.  KYLIE DC.  PATIENT D/C TO HOME,  VIA WHEELCHAIR, ESCORTED OUT BY CNA.

## 2017-04-27 NOTE — DISCHARGE INSTRUCTIONS
ACTIVITY: Rest and take it easy for the first 24 hours.  A responsible adult is recommended to remain with you during that time.  It is normal to feel sleepy.  We encourage you to not do anything that requires balance, judgment or coordination.    MILD FLU-LIKE SYMPTOMS ARE NORMAL. YOU MAY EXPERIENCE GENERALIZED MUSCLE ACHES, THROAT IRRITATION, HEADACHE AND/OR SOME NAUSEA.    FOR 24 HOURS DO NOT:  Drive, operate machinery or run household appliances.  Drink beer or alcoholic beverages.   Make important decisions or sign legal documents.    SPECIAL INSTRUCTIONS: *KEEP INCISION DRY FOR 24 HRS**    DIET: To avoid nausea, slowly advance diet as tolerated, avoiding spicy or greasy foods for the first day.  Add more substantial food to your diet according to your physician's instructions.  Babies can be fed formula or breast milk as soon as they are hungry.  INCREASE FLUIDS AND FIBER TO AVOID CONSTIPATION.    SURGICAL DRESSING/BATHING: *MAY SHOWER TOMORROW AFTERNOON THEN MAY REMOVE BAND-AID**    FOLLOW-UP APPOINTMENT:  A follow-up appointment should be arranged with your doctor in *PLEASE KEEP UP WITH DIALYSIS APPT .  NO NEED TO FOLLOW-UP WITH DR SULLIVAN **; call to schedule.    You should CALL YOUR PHYSICIAN if you develop:  Fever greater than 101 degrees F.  Pain not relieved by medication, or persistent nausea or vomiting.  Excessive bleeding (blood soaking through dressing) or unexpected drainage from the wound.  Extreme redness or swelling around the incision site, drainage of pus or foul smelling drainage.  Inability to urinate or empty your bladder within 8 hours.  Problems with breathing or chest pain.    You should call 911 if you develop problems with breathing or chest pain.  If you are unable to contact your doctor or surgical center, you should go to the nearest emergency room or urgent care center.  Physician's telephone #: *DR SULLIVAN  [phone] **    If any questions arise, call your doctor.  If your  doctor is not available, please feel free to call the Surgical Center at [phone].  The Center is open Monday through Friday from 7AM to 7PM.  You can also call the HEALTH HOTLINE open 24 hours/day, 7 days/week and speak to a nurse at [phone], or toll free at [phone].    A registered nurse may call you a few days after your surgery to see how you are doing after your procedure.    MEDICATIONS: Resume taking daily medication.  Take prescribed pain medication with food.  If no medication is prescribed, you may take non-aspirin pain medication if needed.  PAIN MEDICATION CAN BE VERY CONSTIPATING.  Take a stool softener or laxative such as senokot, pericolace, or milk of magnesia if needed.      If your physician has prescribed pain medication that includes Acetaminophen (Tylenol), do not take additional Acetaminophen (Tylenol) while taking the prescribed medication.    Depression / Suicide Risk    As you are discharged from this [facility] facility, it is important to learn how to keep safe from harming yourself.    Recognize the warning signs:  · Abrupt changes in personality, positive or negative- including increase in energy   · Giving away possessions  · Change in eating patterns- significant weight changes-  positive or negative  · Change in sleeping patterns- unable to sleep or sleeping all the time   · Unwillingness or inability to communicate  · Depression  · Unusual sadness, discouragement and loneliness  · Talk of wanting to die  · Neglect of personal appearance   · Rebelliousness- reckless behavior  · Withdrawal from people/activities they love  · Confusion- inability to concentrate     If you or a loved one observes any of these behaviors or has concerns about self-harm, here's what you can do:  · Talk about it- your feelings and reasons for harming yourself  · Remove any means that you might use to hurt yourself (examples: pills, rope, extension cords, firearm)  · Get professional  help from the community (Mental Health, Substance Abuse, psychological counseling)  · Do not be alone:Call your Safe Contact- someone whom you trust who will be there for you.  · Call your local CRISIS HOTLINE 845-2666 or 692-812-2958  · Call your local Children's Mobile Crisis Response Team [place] (559) 804-8807 or www.[employer]  · Call the toll free National Suicide Prevention Hotlines   · National Suicide Prevention Lifeline 290-785-HOQZ (5329)  · National Hope Line Network 800-SUICIDE (103-4710)

## 2017-05-09 ENCOUNTER — TELEMEDICINE2 (OUTPATIENT)
Dept: VASCULAR LAB | Facility: MEDICAL CENTER | Age: 82
End: 2017-05-09
Payer: MEDICARE

## 2017-05-09 ENCOUNTER — ANTICOAGULATION MONITORING (OUTPATIENT)
Dept: VASCULAR LAB | Facility: MEDICAL CENTER | Age: 82
End: 2017-05-09
Payer: MEDICARE

## 2017-05-09 ENCOUNTER — NON-PROVIDER VISIT (OUTPATIENT)
Dept: MEDICAL GROUP | Facility: PHYSICIAN GROUP | Age: 82
End: 2017-05-09
Payer: MEDICARE

## 2017-05-09 VITALS — SYSTOLIC BLOOD PRESSURE: 102 MMHG | HEART RATE: 81 BPM | DIASTOLIC BLOOD PRESSURE: 58 MMHG

## 2017-05-09 DIAGNOSIS — I48.91 ATRIAL FIBRILLATION, UNSPECIFIED TYPE (HCC): ICD-10-CM

## 2017-05-09 DIAGNOSIS — Z79.01 CHRONIC ANTICOAGULATION: ICD-10-CM

## 2017-05-09 LAB
INR BLD: 3.2 (ref 0.9–1.2)
INR PPP: 3.2 (ref 2–3.5)
POC PROTIME: ABNORMAL

## 2017-05-09 PROCEDURE — 85610 PROTHROMBIN TIME: CPT | Performed by: PHYSICIAN ASSISTANT

## 2017-05-09 NOTE — PROGRESS NOTES
OP Anticoagulation Service Note    Date: 5/9/2017       Anticoagulation Summary as of 5/9/2017     INR goal 2.0-3.0   Selected INR 3.2! (5/9/2017)   Maintenance plan 4 mg (4 mg x 1) every day   Weekly total 28 mg   Plan last modified Felipe Anaya, PHARMD (5/9/2017)   Next INR check    Target end date Indefinite    Indications   Atrial fibrillation (HCC) [I48.91]         Anticoagulation Episode Summary     INR check location Outside Lab    Preferred lab     Send INR reminders to     Comments Please do not call before noon  [facility] for lab draws      Anticoagulation Care Providers     Provider Role Specialty Phone number    Jesús Bacon M.D. Referring Interventional Cardiology [phone]    Michael Morel, PHARMD Responsible          Anticoagulation Patient Findings   Positives Antibiotic Use    Negatives Missed Doses, Extra Doses, Medication Changes, Diet Changes, Dental/Other Procedures, Hospitalization, Bleeding Gums, Nose Bleeds, Blood in Urine, Blood in Stool, Any Bruising, Other Complaints            THIS VISIT CONDUCTED WITH [employer] UTILIZING SECURE AND ENCRYPTED VIDEOCONFERENCING EQUIPMENT    ROS:    Pulm: Denies SOB, chest pain.    Card: Denies syncope, edema, palpitations.    Extremities: Denies redness, pain.     PE:    Pulm: No SOB, even and unlabored.    Card: Normal rate and rhythm.    Extremities: No redness, or edema.     INR  supra-therapeutic.    Denies signs/symptoms of bleeding and/or thrombosis.    Denies changes to diet or medications.   Follow up appt in 1 weeks     Plan:  Hold tomorrow amd hold Friday while on septra then continue weekly warfarin dose as noted    Felipe Anaya, PHARMD    Medication: Warfarin (Coumadin)     Sunday Monday Tuesday Wednesday Thursday Friday Saturday        4mg      4mg      4mg      4mg      4 mg      4mg      4mg        1tab(s)      1tab(s)      1tab(s)      1tab(s)      1 tab(s)      1tab(s)     1tab(s)                      Next Appointment: Tuesday, 5/16/2017 at 1:45pm      Review all of your home medications and newly ordered medications with your doctor and / or pharmacist. Follow medication instructions as directed by your doctor and / or pharmacist. Please keep your medication list with you and share with your physician. Update the information when medications are discontinued, doses are changed, or new medications (including over-the-counter products) are added; and carry medication information at all times in the event of emergency situations.      For questions, please contact Outpatient Anticoagulation Service [phone].     Felipe Anaya, PHARMD

## 2017-05-16 ENCOUNTER — NON-PROVIDER VISIT (OUTPATIENT)
Dept: MEDICAL GROUP | Facility: PHYSICIAN GROUP | Age: 82
End: 2017-05-16
Payer: MEDICARE

## 2017-05-16 ENCOUNTER — APPOINTMENT (OUTPATIENT)
Dept: VASCULAR LAB | Facility: MEDICAL CENTER | Age: 82
End: 2017-05-16
Payer: MEDICARE

## 2017-05-16 ENCOUNTER — ANTICOAGULATION MONITORING (OUTPATIENT)
Dept: VASCULAR LAB | Facility: MEDICAL CENTER | Age: 82
End: 2017-05-16

## 2017-05-16 DIAGNOSIS — Z79.01 CHRONIC ANTICOAGULATION: ICD-10-CM

## 2017-05-16 LAB
INR BLD: 1.7 (ref 0.9–1.2)
INR PPP: 1.7 (ref 2–3.5)
POC PROTIME: ABNORMAL

## 2017-05-16 PROCEDURE — 85610 PROTHROMBIN TIME: CPT | Performed by: PHYSICIAN ASSISTANT

## 2017-05-16 NOTE — PROGRESS NOTES
OP Anticoagulation Service Note    Date: 5/16/2017  Anticoagulation Summary as of 5/16/2017     INR goal 2.0-3.0   Selected INR 1.7! (5/16/2017)   Maintenance plan 8 mg (4 mg x 2) on Tue, Thu, Sat; 4 mg (4 mg x 1) all other days   Weekly total 40 mg   Plan last modified Naya Chavez PHARMD (5/16/2017)   Next INR check 5/30/2017   Target end date Indefinite    Indications   Atrial fibrillation (HCC) [I48.91]         Anticoagulation Episode Summary     INR check location Outside Lab    Preferred lab     Send INR reminders to     Comments Please do not call before noon  [facility] for lab draws      Anticoagulation Care Providers     Provider Role Specialty Phone number    Jesús Bacon M.D. Referring Interventional Cardiology [phone]    Michael Morel, PHARMD Responsible          Anticoagulation Patient Findings   Negatives Missed Doses, Extra Doses, Medication Changes, Antibiotic Use, Diet Changes, Dental/Other Procedures, Hospitalization, Bleeding Gums, Nose Bleeds, Blood in Urine, Blood in Stool, Any Bruising, Other Complaints    Comments BP: 102/58  HR: 131(regular)          THIS VISIT CONDUCTED WITH [employer] SECURE AND ENCRYPTED VIDEOCONFERENCING EQUIPMENT    ROS:    Pulm: Denies SOB, chest pain.    Card: Denies syncope, edema, palpitations.    Extremities: Denies redness, pain.     PE:    Pulm: No SOB, even and unlabored.    Card: Normal rate and rhythm.    Extremities: No redness, or edema.     INR  subtherapeutic    Denies signs/symptoms of bleeding and/or thrombosis.     Denies changes to diet or medications.    Follow up appt in 1 week after procedure    Plan:  INR is subtherapeutic. Patient stopped his warfarin for a procedure on 5-23-17. CHADS-VASC = 3. Remain off your warfarin for your procedure. Restart when ok with your provider at your usual dosing regimen as outlined. Patient had high HR, regular rhythm. Pt also report watery diarrhea and feeling  nauseated the past few days. Recommended pt seek medical attention.     Medication: Warfarin (Coumadin)     Sunday Monday Tuesday Wednesday Thursday Friday Saturday      4 mg    4 mg    8 mg    4 mg    8 mg    4 mg    8 mg      1 tab(s)    1 tab(s)    2 tab(s)    1 tab(s)    2 tab(s)    1 tab(s)  2tab(s)       Next Appointment: Tuesday, May 30th at 1:30 PM      For questions, please contact Outpatient Anticoagulation Service [phone].     Naya Chavez, Pharm D

## 2017-06-01 ENCOUNTER — OFFICE VISIT (OUTPATIENT)
Dept: CARDIOLOGY | Facility: PHYSICIAN GROUP | Age: 82
End: 2017-06-01
Payer: MEDICARE

## 2017-06-01 VITALS
DIASTOLIC BLOOD PRESSURE: 60 MMHG | BODY MASS INDEX: 22.2 KG/M2 | WEIGHT: 130 LBS | HEIGHT: 64 IN | SYSTOLIC BLOOD PRESSURE: 90 MMHG | HEART RATE: 70 BPM

## 2017-06-01 DIAGNOSIS — N18.6 ESRD (END STAGE RENAL DISEASE) (HCC): ICD-10-CM

## 2017-06-01 DIAGNOSIS — Z95.1 HX OF CABG: ICD-10-CM

## 2017-06-01 DIAGNOSIS — I48.20 CHRONIC ATRIAL FIBRILLATION (HCC): ICD-10-CM

## 2017-06-01 DIAGNOSIS — Z79.01 CHRONIC ANTICOAGULATION: ICD-10-CM

## 2017-06-01 DIAGNOSIS — Z95.2 S/P TAVR (TRANSCATHETER AORTIC VALVE REPLACEMENT): ICD-10-CM

## 2017-06-01 PROCEDURE — 4040F PNEUMOC VAC/ADMIN/RCVD: CPT | Performed by: INTERNAL MEDICINE

## 2017-06-01 PROCEDURE — G8598 ASA/ANTIPLAT THER USED: HCPCS | Performed by: INTERNAL MEDICINE

## 2017-06-01 PROCEDURE — G8420 CALC BMI NORM PARAMETERS: HCPCS | Performed by: INTERNAL MEDICINE

## 2017-06-01 PROCEDURE — G8432 DEP SCR NOT DOC, RNG: HCPCS | Performed by: INTERNAL MEDICINE

## 2017-06-01 PROCEDURE — 1101F PT FALLS ASSESS-DOCD LE1/YR: CPT | Mod: 8P | Performed by: INTERNAL MEDICINE

## 2017-06-01 PROCEDURE — 99213 OFFICE O/P EST LOW 20 MIN: CPT | Performed by: INTERNAL MEDICINE

## 2017-06-01 PROCEDURE — 1036F TOBACCO NON-USER: CPT | Performed by: INTERNAL MEDICINE

## 2017-06-01 NOTE — PROGRESS NOTES
Subjective:   Erasmo North is a 86 y.o. male who presents today for followup of atrial fibrillation, coronary artery disease, and AS. He unfortunately was diagnosed with right kidney cancer and is status post nephrectomy with Dr. Iker Bey and has been initiated on dialysis.     He is now s/p TAVR and feeling somewhat better, ongoing KAUR which is slightly improved. Testing revealed concomitant severe pulmonary diffusing capacity abnormality as well. Notes leg and back pain, planning pphysical therapy, and low BPs with HDD. LVEF has normalized.    Past Medical History   Diagnosis Date   • Atrial fibrillation (CMS-HCC)    • Hypertension    • CAD (coronary artery disease) of bypass graft    • Osteoporosis    • Genital herpes    • S/P colonoscopy 11/2010   • Diverticulosis    • Colon polyps      sessile    • Hemorrhoids      internal   • Hiatal hernia    • Tachy-millicent syndrome    • Urothelial carcinoma of kidney (CMS-HCC) 2/22/2014     Bx right kidney 2/3/14   • Dialysis patient      Monday, Wednesday, Friday at [facility] in [place]   • Arthritis    • Myocardial infarct (CMS-HCC) 1971   • Bowel habit changes      occasional diarrhea   • Dental disorder      upper/lower   • Breath shortness      with exertion   • Cataract      surgery bilateral IOL   • Urothelial carcinoma (CMS-HCC) 2/22/2014     Of the bladder; bx 2/3/14; Dr. Bey, pt states right kidney   • Aortic stenosis, severe    • Psoriasis    • Dialysis AV fistula infection (CMS-HCC)      right arm   • BPH (benign prostatic hyperplasia)    • Bladder tumor      Past Surgical History   Procedure Laterality Date   • Coronary artery bypass, 3  1974, 1983      had 2 times   • Hernia repair  1999   • Tonsillectomy     • Cath placement Left 8/31/2015     Procedure: CATH PLACEMENT, left IJ tunneled catheter;  Surgeon: Jaswinder Oneil M.D.;  Location: SURGERY [facility] [place];  Service:    • Av fistula creation Right 9/8/2015     Procedure: AV FISTULA CREATION ARM;  Surgeon:  Jaswinder Oneil M.D.;  Location: SURGERY [facility];  Service:    • Recovery  11/12/2015     Procedure: VASCULAR CASE-ANTHONY-RIGHT ARM FISTULOGRAM WITH ANGIOPLASTY 69117, 91729, [id number]-SJD STAGE RENAL DISEASE N18.6;  Surgeon: [facility];  [place] [facility] [place];  Service:    • Inguinal hernia repair  1/5/2012     Performed by ROSI PHILLIPS at SURGERY [facility]   • Exploratory laparotomy  1/8/2012     Performed by ROSI PHILLIPS at SURGERY [facility]   • Bowel resection  1/8/2012     Performed by ROSI PHILLIPS at SURGERY [facility]   • Other abdominal surgery  5/29/2015     right nephrectomy, [facility]   • Transcatheter aortic valve replacement  11/28/2016     Procedure: TRANSCATHETER AORTIC VALVE REPLACEMENT WITH CHAZ;  Surgeon: Mateo Perez M.D.;  Location: SURGERY [facility];  Service:      Family History   Problem Relation Age of Onset   • Heart Disease Mother      MI age 77 yo   • Heart Disease Brother      MI 38 yo   • Heart Disease Brother      MI 38 yo   • Cancer Neg Hx    • Diabetes Neg Hx      History   Smoking status   • Former Smoker -- 1.00 packs/day for 27 years   • Types: Cigarettes   • Quit date: 01/01/1974   Smokeless tobacco   • Never Used     Comment: started age 17, quit age 44     No Known Allergies  Outpatient Encounter Prescriptions as of 6/1/2017   Medication Sig Dispense Refill   • sulfamethoxazole-trimethoprim (BACTRIM DS) 800-160 MG tablet Take 1 Tab by mouth 2 times a day.     • omeprazole (PRILOSEC) 40 MG delayed-release capsule Take 40 mg by mouth every day.     • metoprolol SR (TOPROL XL) 25 MG TABLET SR 24 HR Take 25 mg by mouth every day.     • acetaminophen (TYLENOL) 325 MG Tab Take 325-650 mg by mouth every four hours as needed for Moderate Pain.     • warfarin (COUMADIN) 4 MG Tab Take 4 mg by mouth every day. Pt takes:  4MG   8MG Thur  Per pt report instructed to continue/do not stop for scheduled procedure 4.27.17  "per Dr Lugo; will not take am 4.27.17     • Multiple Vitamins-Minerals (CENTRUM ADULTS PO) Take 1 Tab by mouth every day.     • IRON PO Take 1 Tab by mouth every day.     • B Complex Vitamins (VITAMIN B COMPLEX PO) Take 1 Tab by mouth every day.     • Calcium Citrate-Vitamin D (CALCIUM + D PO) Take 1 Tab by mouth every day.     • atorvastatin (LIPITOR) 10 MG Tab Take 10 mg by mouth every evening.     • finasteride (PROSCAR) 5 MG Tab Take 5 mg by mouth every bedtime.       No facility-administered encounter medications on file as of 6/1/2017.     Today I reviewed the medical, surgical, social and family histories with the patient. As per HPI, otherwise noncontributory.    Review of Systems   All other systems reviewed and are negative.       Objective:   BP 90/60 mmHg  Pulse 70  Ht 1.626 m (5' 4\")  Wt 58.968 kg (130 lb)  BMI 22.30 kg/m2    Physical Exam   Constitutional: He is oriented to person, place, and time. He appears well-developed. No distress.   Thin but stable.  No cane or walker.  Good get up and go.   HENT:   Head: Normocephalic and atraumatic.   Mouth/Throat: Oropharynx is clear and moist.   Eyes: Conjunctivae are normal. Pupils are equal, round, and reactive to light. No scleral icterus.   Neck: No JVD present. No tracheal deviation present. No thyromegaly present.   Cardiovascular: Normal rate and intact distal pulses.  An irregular rhythm present. PMI is not displaced.  Exam reveals no gallop and no friction rub.    Murmur (Soft 1-2/6 WILFREDO) heard.  Pulses:       Radial pulses are 2+ on the right side, and 2+ on the left side.        Dorsalis pedis pulses are 2+ on the right side, and 2+ on the left side.   R arm fistula with thrill   Pulmonary/Chest: Effort normal and breath sounds normal. He has no wheezes. He has no rales.   Abdominal: Soft. Bowel sounds are normal. He exhibits no distension and no pulsatile midline mass. There is no tenderness. There is no guarding.   Musculoskeletal: He " exhibits no edema.   Neurological: He is alert and oriented to person, place, and time. No cranial nerve deficit.   Skin: Skin is warm and dry. No rash noted. He is not diaphoretic. No erythema.   Psychiatric: He has a normal mood and affect. His behavior is normal. Judgment and thought content normal.     CARDIAC STUDIES/PROCEDURES:    CARDIAC CATHETERIZATION CONCLUSIONS (11/10/16)  1.  Severe aortic stenosis with peak gradient of 30 mmHg, mean gradient of 19    mmHg, calculated ZEYAD of 0.99 cm2.  2.  Coronary artery disease including occluded ostial left main coronary    artery and proximal right coronary artery with patent left internal mammary    artery to the left anterior descending artery and saphenous vein graft to the    posterior descending artery, occluded single saphenous vein graft.  3.  Mildly reduced left ventricular systolic function with ejection fraction    of 50%.  4.  Moderately calcified trileaflet aortic valve, mild dilation of the    ascending aorta.  5.  Atherosclerotic distal abdominal aorta with small aneurysmal pouches,    severely calcified bilateral ostial to proximal common iliac system, right    greater than left and right iliacs also containing eccentric 40-50% stenosis.    CT OF CHEST AND ABDOMEN (11/04/16)  1. Tricuspid aortic valve with moderate calcifications.  Annulus area: 566-570 mm?  Annulus diameter: 23.7 x 29.3 mm  Diameter at the level of the sinuses: 38.2 x 38.4 x 39.2 mm  2. Access evaluation:  Mild scattered calcification and low density plaque.  Measurement, minimal luminal diameter:  Right mid external iliac artery:   8.4 mm  Left proximal external iliac artery: 8.0  mm  3. Postsurgical changes from CABG.  4. Small left pleural effusion, trace right pleural effusion with adjacent opacities, likely atelectasis.    CAROTID ULTRASOUND (11/08/16)    Carotid ultrasound showing mild to moderate carotid artery stenosis.    ECHOCARDIOGRAM CONCLUSIONS (11/29/16)  Normal left  ventricular systolic function, EF 65%.   Mild concentric left ventricular hypertrophy.   Moderately dilated right ventricle.   Severely dilated left atrium.   Mitral annular calcification.   Mild mitral regurgitation.  Known TAVR aortic valve that is functioning normally with normal   transvalvular gradients.    Transvalvular gradients are - Peak: 14 mmHg, Mean: 7 mmHg.   Trace paravalvular leak is noted.   Mild tricuspid regurgitation.   Right ventricular systolic pressure is estimated to be 35 mmHg.  Compared to the images of the study done 8/31/15 - there has been   interval placement of a bioprosthetic aortic valve that is functioning   normally and normalization of left ventricular ejection fraction.    ECHOCARDIOGRAM CONCLUSIONS at [facility] (07/05/16)  Echocardiogram showing ejection fraction of 50%, severe aortic stenosis with peak velocity of 4.01 m/s, peak gradient of 68 mmHg, mean gradient of 41mmHg and calculated ZEYAD of 0.6 cm2.    EKG performed on (11/29/16) was reviewed: EKG shows atrial fibrillation with right bundle branch block    Laboratory results of (11/29/16) were reviewed. Bun of 20 mg/dl, creatinine levels of 2.66 mg/dl noted.    PULMONARY FUNCTION INTERPRETATION (10/27/16)  1.  The mechanics of ventilation are normal.  2.  There is no improvement following the administration of an inhaled    bronchodilator.  Clinical improvement may occur in the absence of documented    spirometric improvement.  3.  Normal total lung capacity by body plethysmography.  This rules out    restrictive lung defect.  4.  Severely decreased diffusing capacity for carbon monoxide, 51% of    predicted; this indicates loss of alveolocapillary membrane surface area.  5.  Normal airway resistance.  6.  Review of the flow volume loop shows a normal tracing.  7.  Normal room air oximetry.    TRANSESOPHAGEAL ECHOCARDIOGRAM CONCLUSIONS (11/28/16)  TAVR case. Trileaflet aortic stenosi. PG only 18 .  Post #29 TAVR with   pinpoint leak at 1 oclock in the .   Short axis. LVOT/ CW valve=12.2/30 c/w well functioning valve.  EF=50%.    Assessment:     1. Rate controlled atrial fibrillation, permanent  2. S/P TAVR (#29 Isabell S3) 11/29/2016  3. Coronary artery disease s/p CABG 1975 and 1984  4. Renal cancer, s/p R nephrectomy  5. Abnormal PFTs (51% deiffusing capacity)  6. History of tachycardia-bradycardia syndrome.  7. Hypertension well controlled  8. HDDRF  9. Chronic oral anticoagulation with coumadin    Medical Decision Making:  Today's Assessment / Status / Plan:     He is actually doing well from a frailty standpoint, ambulatory and NYHA class 1 KAUR related to his pulmonary issues and deconditioning. Ambulatory saturations were normal today on RA. Can cut Toprol in half on HDD days.    Recommendations:    1. Continue medications as noted above    2. Continue coumadin, INR 2-3.     3. Exercise      [id number]